# Patient Record
Sex: MALE | Race: WHITE | NOT HISPANIC OR LATINO | Employment: OTHER | URBAN - METROPOLITAN AREA
[De-identification: names, ages, dates, MRNs, and addresses within clinical notes are randomized per-mention and may not be internally consistent; named-entity substitution may affect disease eponyms.]

---

## 2017-03-28 PROBLEM — F41.8 ANXIETY ASSOCIATED WITH DEPRESSION: Status: ACTIVE | Noted: 2017-03-28

## 2017-07-24 PROBLEM — E11.00 TYPE 2 DIABETES MELLITUS WITH HYPEROSMOLARITY WITHOUT COMA, WITHOUT LONG-TERM CURRENT USE OF INSULIN (HCC): Status: ACTIVE | Noted: 2017-07-24

## 2017-08-25 PROBLEM — Z79.4 TYPE 2 DIABETES MELLITUS WITH HYPEROSMOLARITY WITHOUT COMA, WITH LONG-TERM CURRENT USE OF INSULIN (HCC): Status: ACTIVE | Noted: 2017-07-24

## 2018-02-26 PROBLEM — Z91.148 CONTROLLED SUBSTANCE AGREEMENT TERMINATED: Status: ACTIVE | Noted: 2018-02-26

## 2019-10-25 ENCOUNTER — TELEPHONE (OUTPATIENT)
Dept: SCHEDULING | Facility: IMAGING CENTER | Age: 54
End: 2019-10-25

## 2019-11-05 ENCOUNTER — OFFICE VISIT (OUTPATIENT)
Dept: MEDICAL GROUP | Facility: PHYSICIAN GROUP | Age: 54
End: 2019-11-05
Payer: MEDICARE

## 2019-11-05 VITALS
HEIGHT: 76 IN | DIASTOLIC BLOOD PRESSURE: 96 MMHG | WEIGHT: 315 LBS | BODY MASS INDEX: 38.36 KG/M2 | HEART RATE: 112 BPM | OXYGEN SATURATION: 92 % | SYSTOLIC BLOOD PRESSURE: 130 MMHG | TEMPERATURE: 83.3 F

## 2019-11-05 DIAGNOSIS — Z76.89 ESTABLISHING CARE WITH NEW DOCTOR, ENCOUNTER FOR: ICD-10-CM

## 2019-11-05 DIAGNOSIS — E03.9 HYPOTHYROIDISM, UNSPECIFIED TYPE: ICD-10-CM

## 2019-11-05 DIAGNOSIS — F41.8 ANXIETY ASSOCIATED WITH DEPRESSION: ICD-10-CM

## 2019-11-05 DIAGNOSIS — Z23 NEED FOR VACCINATION: ICD-10-CM

## 2019-11-05 DIAGNOSIS — E11.00 TYPE 2 DIABETES MELLITUS WITH HYPEROSMOLARITY WITHOUT COMA, WITHOUT LONG-TERM CURRENT USE OF INSULIN (HCC): ICD-10-CM

## 2019-11-05 DIAGNOSIS — E11.00 TYPE 2 DIABETES MELLITUS WITH HYPEROSMOLARITY WITHOUT COMA, WITH LONG-TERM CURRENT USE OF INSULIN (HCC): ICD-10-CM

## 2019-11-05 DIAGNOSIS — Z79.4 TYPE 2 DIABETES MELLITUS WITH HYPEROSMOLARITY WITHOUT COMA, WITH LONG-TERM CURRENT USE OF INSULIN (HCC): ICD-10-CM

## 2019-11-05 PROCEDURE — 90686 IIV4 VACC NO PRSV 0.5 ML IM: CPT | Performed by: NURSE PRACTITIONER

## 2019-11-05 PROCEDURE — 99204 OFFICE O/P NEW MOD 45 MIN: CPT | Mod: 25 | Performed by: NURSE PRACTITIONER

## 2019-11-05 PROCEDURE — G0008 ADMIN INFLUENZA VIRUS VAC: HCPCS | Performed by: NURSE PRACTITIONER

## 2019-11-05 RX ORDER — INSULIN DEGLUDEC 200 U/ML
INJECTION, SOLUTION SUBCUTANEOUS
COMMUNITY
Start: 2019-10-07 | End: 2019-11-19 | Stop reason: SDUPTHER

## 2019-11-05 RX ORDER — DULAGLUTIDE 0.75 MG/.5ML
INJECTION, SOLUTION SUBCUTANEOUS
COMMUNITY
Start: 2019-10-09 | End: 2019-11-19 | Stop reason: SDUPTHER

## 2019-11-05 ASSESSMENT — PATIENT HEALTH QUESTIONNAIRE - PHQ9
CLINICAL INTERPRETATION OF PHQ2 SCORE: 3
SUM OF ALL RESPONSES TO PHQ QUESTIONS 1-9: 12
5. POOR APPETITE OR OVEREATING: 2 - MORE THAN HALF THE DAYS

## 2019-11-05 NOTE — PROGRESS NOTES
Chief Complaint   Patient presents with   • Establish Care     Pt sts he is here to establish care and to get some medications filled.        HISTORY OF PRESENT ILLNESS: Patient is a 54 y.o. male, new  patient who presents today to discuss medical problems as listed below:    Establishing care with new doctor, encounter for  New patient today.  His anxiety is a meeting concern today.  He does not need any refills today.    Anxiety associated with depression  New problem today.  Patient states chronic anxiety and depression for long time.  Patient is a former [employer] in the [employer].  Is a current everyday smoker, 1 pack for 40 years.  He does have [facility] resources for depression that he utilized in the past.  He was on Lexapro a few years ago which was semi-helpful, not on any antidepressants at this time.  He is single, has a dog.  He has a daughter in the area was 25 and at his support system, he does not have many friends.  Patient is interested in therapy and referral today.  He denies suicidal or homicidal ideations, no attacks in the past.      Patient Active Problem List    Diagnosis Date Noted   • Need for vaccination 11/05/2019   • Establishing care with new doctor, encounter for 11/05/2019   • Controlled substance agreement terminated- no further controlled Rx from [facility]. 02/26/2018   • Type 2 diabetes mellitus with hyperosmolarity without coma, with long-term current use of insulin (HCC) 07/24/2017   • Anxiety associated with depression 03/28/2017   • Insomnia 08/31/2016   • Stasis dermatitis of both legs 06/10/2015   • Edema 06/10/2015   • Neuropathy of foot 06/10/2015   • H/O chronic active hepatitis 02/13/2015   • Chronic pain due to injury 07/09/2014   • Lumbago 07/01/2014   • Hypothyroidism 07/01/2014        Allergies: Vicodin [hydrocodone-acetaminophen]    Current Outpatient Medications   Medication Sig Dispense Refill   • TRULICITY 0.75 MG/0.5ML Solution Pen-injector      • TRESIBA FLEXTOUCH 200 UNIT/ML  Solution Pen-injector      • levothyroxine (SYNTHROID) 100 MCG Tab Take 1 Tab by mouth Every morning on an empty stomach. 90 Tab 3   • insulin glargine (LANTUS SOLOSTAR) 100 UNIT/ML Solution Pen-injector injection Inject 70 units nightly for three days and increase by 3 units every 3 days until morning fasting blood sugar is 150 or less. 15 PEN 1   • metformin (GLUCOPHAGE) 1000 MG tablet Take 1 Tab by mouth 2 times a day, with meals. 180 Tab 0   • ONE TOUCH ULTRA TEST strip 1 Strip by Other route 3 times a day. ICD 10 E11.00 100 Strip 3   • ammonium lactate (LAC-HYDRIN) 12 % Lotion RUB IN THOROUGHLY TO AFFECTED AREA TWICE DAILY 400 g 3   • glucose blood strip 1 Strip by Other route 3 times a day. 50 Strip 3   • ONETOUCH DELICA LANCETS 33G Misc 1 Device by Does not apply route 3 times a day. 100 Each 3   • furosemide (LASIX) 40 MG Tab Take 1 Tab by mouth every day. 90 Tab 3   • potassium chloride (KLOR-CON) 20 MEQ Pack Take 1 Packet by mouth 2 times a day. 90 Packet 3   • Blood Glucose Monitoring Suppl Supplies Misc Test twice daily, fasting and 2 hours post prandial 100 Strip 3   • Lancets Misc Lantus solostar needles  Sig: use prn ssx high or low sugar. DX: E11.00 100 Each 2   • escitalopram (LEXAPRO) 10 MG Tab Take 1 Tab by mouth every day. (Patient not taking: Reported on 11/5/2019) 30 Tab 1     No current facility-administered medications for this visit.        Social History     Tobacco Use   • Smoking status: Current Every Day Smoker     Packs/day: 1.00     Years: 40.00     Pack years: 40.00     Types: Cigarettes   • Smokeless tobacco: Never Used   Substance Use Topics   • Alcohol use: No   • Drug use: No     Social History     Patient does not qualify to have social determinant information on file (likely too young).   Social History Narrative   • Not on file       Family History   Problem Relation Age of Onset   • Heart Disease Father    • Alcohol abuse Father        Allergies, past medical history, past  "surgical history, family history, social history reviewed and updated.    Review of Systems:      - Constitutional: Negative for fever, chills, unexpected weight change, and fatigue/generalized weakness.     - HEENT: Negative for headaches, vision changes, hearing changes, ear pain, ear discharge, rhinorrhea, sinus congestion, sore throat, and neck pain.      - Respiratory: Negative for cough, sputum production, chest congestion, dyspnea, wheezing, and crackles.      - Cardiovascular: Negative for chest pain, palpitations, orthopnea, and bilateral lower extremity edema.     - Gastrointestinal: Negative for heartburn, nausea, vomiting, abdominal pain, hematochezia, melena, diarrhea, constipation, and greasy/foul-smelling stools.     - Genitourinary: Negative for dysuria, polyuria, hematuria, pyuria, urinary urgency, and urinary incontinence.    - Musculoskeletal: Negative for myalgias, back pain, and joint pain.     - Skin: Negative for rash, itching, cyanotic skin color change.     - Neurological: Negative for dizziness, tingling, tremors, focal sensory deficit, focal weakness and headaches.     - Endo/Heme/Allergies: Does not bruise/bleed easily.     - Psychiatric/Behavioral: Anxiety and depression.  Negative for suicidal/homicidal ideation and memory loss.      All other systems reviewed and are negative    Exam:    /96 (BP Location: Left arm, Patient Position: Sitting, BP Cuff Size: Large adult)   Pulse (!) 112   Temp (!) 28.5 °C (83.3 °F) (Temporal)   Ht 1.93 m (6' 4\")   Wt (!) 151 kg (333 lb)   SpO2 92%   BMI 40.53 kg/m²  Body mass index is 40.53 kg/m².    Physical Exam:  Constitutional: Well-developed and well-nourished. Not diaphoretic. No distress.   Skin: Skin is warm and dry. No rash noted.  Head: Atraumatic without lesions.  Eyes: Conjunctivae and extraocular motions are normal. Pupils are equal, round, and reactive to light. No scleral icterus.   Ears:  External ears unremarkable. Tympanic " membranes clear and intact.  Nose: Nares patent. Septum midline. Turbinates without erythema nor edema. No discharge.   Mouth/Throat: Dentition is normal. Tongue normal. Oropharynx is clear and moist. Posterior pharynx without erythema or exudates.  Neck: Supple, trachea midline. Normal range of motion. No thyromegaly present. No lymphadenopathy--cervical or supraclavicular.  Cardiovascular: Regular rate and rhythm, S1 and S2 without murmur, rubs, or gallops.    Chest: Effort normal. Clear to auscultation throughout. No adventitious sounds. No CVA tenderness.  Abdomen: Soft, non tender, and without distention. Active bowel sounds in all four quadrants. No rebound, guarding, masses or HSM.  : Negative for dysuria, polyuria, hematuria, pyuria, urinary urgency, and urinary incontinence.  Extremities: No cyanosis, clubbing, erythema, nor edema. Distal pulses intact and symmetric.   Musculoskeletal: All major joints AROM full in all directions without pain.  Neurological: Alert and oriented x 3. DTRs 2+/3 and symmetric. No cranial nerve deficit. 5/5 myotomes. Sensation intact. Negative Rhomberg.  Psychiatric:  Behavior, mood, and affect are appropriate.    Assessment/Plan:  1. Need for vaccination  - Influenza Vaccine Quad Injection (PF)    2. Type 2 diabetes mellitus with hyperosmolarity without coma, without long-term current use of insulin (HCC)    3. Type 2 diabetes mellitus with hyperosmolarity without coma, with long-term current use of insulin (HCC)  - Comp Metabolic Panel; Future  - HEMOGLOBIN A1C; Future  - Lipid Profile; Future    4. Hypothyroidism, unspecified type  - Comp Metabolic Panel; Future  - FREE THYROXINE; Future  - Lipid Profile; Future  - TSH; Future    5. Anxiety associated with depression  Uncontrolled, stable.  Patient declines need for intervention today.  Referral to behavioral health placed.  Resources and contact information for [facility] given.   We will follow-up next visit.  - CBC WITH DIFFERENTIAL; Future  - Comp Metabolic Panel; Future  - VITAMIN D,25 HYDROXY; Future  - REFERRAL TO BEHAVIORAL HEALTH    6. BMI 29.0-29.9,adult  - CBC WITH DIFFERENTIAL; Future  - Comp Metabolic Panel; Future  - FREE THYROXINE; Future  - HEMOGLOBIN A1C; Future  - Lipid Profile; Future  - TSH; Future    7. Establishing care with new doctor, encounter for      Discussed with patient possible alternative diagnoses, pt is to take all medications as prescribed. If symptoms persist FU w/PCP, if symptoms worsen go to emergency room. If experiencing any side effects from prescribed medications reports to the office immediately or go to emergency room.  Reviewed indication, dosage, usage and potential adverse effects of prescribed medications. Reviewed risks and benefits of treatment plan. Patient verbalizes understanding of all instruction and verbally agrees to plan.    Return in about 2 weeks (around 11/19/2019).

## 2019-11-05 NOTE — ASSESSMENT & PLAN NOTE
New problem today.  Patient states chronic anxiety and depression for long time.  Patient is a former [employer] in [employer].  Is a current everyday smoker, 1 pack for 40 years.  He does have [facility] resources for depression that he utilized in the past.  He was on Lexapro a few years ago which was semi-helpful, not on any antidepressants at this time.  He is single, has a dog.  He has a daughter in the area was 25 and at his support system, he does not have many friends.  Patient is interested in therapy and referral today.  He denies suicidal or homicidal ideations, no attacks in the past.

## 2019-11-11 ENCOUNTER — TELEPHONE (OUTPATIENT)
Dept: MEDICAL GROUP | Facility: PHYSICIAN GROUP | Age: 54
End: 2019-11-11

## 2019-11-12 ENCOUNTER — TELEPHONE (OUTPATIENT)
Dept: MEDICAL GROUP | Facility: PHYSICIAN GROUP | Age: 54
End: 2019-11-12

## 2019-11-12 RX ORDER — INSULIN DEGLUDEC 200 U/ML
INJECTION, SOLUTION SUBCUTANEOUS
Status: CANCELLED | OUTPATIENT
Start: 2019-11-12

## 2019-11-12 NOTE — TELEPHONE ENCOUNTER
1. Caller Name: Douglas Landa Jr.                                           Call Back Number: [phone]        Patient approves a detailed voicemail message: N\A    Spoke with pt, Pt stated that he has enough insulin till [id number]. Told pt that if something occurs, to give us a call and we can send it in sooner. Pt understood.    PJ Sempel  Med Ass't

## 2019-11-19 ENCOUNTER — OFFICE VISIT (OUTPATIENT)
Dept: MEDICAL GROUP | Facility: PHYSICIAN GROUP | Age: 54
End: 2019-11-19
Payer: MEDICARE

## 2019-11-19 VITALS
HEIGHT: 76 IN | SYSTOLIC BLOOD PRESSURE: 132 MMHG | TEMPERATURE: 96.1 F | WEIGHT: 315 LBS | RESPIRATION RATE: 18 BRPM | DIASTOLIC BLOOD PRESSURE: 70 MMHG | HEART RATE: 100 BPM | OXYGEN SATURATION: 94 % | BODY MASS INDEX: 38.36 KG/M2

## 2019-11-19 DIAGNOSIS — Z79.4 TYPE 2 DIABETES MELLITUS WITH HYPEROSMOLARITY WITHOUT COMA, WITH LONG-TERM CURRENT USE OF INSULIN (HCC): ICD-10-CM

## 2019-11-19 DIAGNOSIS — E11.00 TYPE 2 DIABETES MELLITUS WITH HYPEROSMOLARITY WITHOUT COMA, WITH LONG-TERM CURRENT USE OF INSULIN (HCC): ICD-10-CM

## 2019-11-19 DIAGNOSIS — E03.9 HYPOTHYROIDISM, UNSPECIFIED TYPE: ICD-10-CM

## 2019-11-19 DIAGNOSIS — E55.9 VITAMIN D DEFICIENCY: ICD-10-CM

## 2019-11-19 PROCEDURE — 99214 OFFICE O/P EST MOD 30 MIN: CPT | Performed by: NURSE PRACTITIONER

## 2019-11-19 RX ORDER — INSULIN DEGLUDEC 200 U/ML
72 INJECTION, SOLUTION SUBCUTANEOUS DAILY
Qty: 400 ML | Refills: 2 | Status: SHIPPED
Start: 2019-11-19 | End: 2020-06-04

## 2019-11-19 RX ORDER — LANCETS 30 GAUGE
EACH MISCELLANEOUS
Qty: 100 EACH | Refills: 0 | Status: SHIPPED
Start: 2019-11-19 | End: 2020-06-04

## 2019-11-19 RX ORDER — LEVOTHYROXINE SODIUM 0.07 MG/1
75 TABLET ORAL
Qty: 30 TAB | Refills: 2 | Status: SHIPPED
Start: 2019-11-19 | End: 2019-12-17

## 2019-11-19 RX ORDER — LEVOTHYROXINE SODIUM 0.1 MG/1
100 TABLET ORAL
Qty: 90 TAB | Refills: 3 | Status: SHIPPED | OUTPATIENT
Start: 2019-11-19 | End: 2019-11-19

## 2019-11-19 RX ORDER — ERGOCALCIFEROL 1.25 MG/1
50000 CAPSULE ORAL
Qty: 12 CAP | Refills: 2 | Status: SHIPPED
Start: 2019-11-19 | End: 2020-06-04

## 2019-11-19 RX ORDER — DULAGLUTIDE 0.75 MG/.5ML
0.75 INJECTION, SOLUTION SUBCUTANEOUS
Qty: 4 PEN | Refills: 2 | Status: SHIPPED | OUTPATIENT
Start: 2019-11-19 | End: 2020-04-07 | Stop reason: SDUPTHER

## 2019-11-20 RX ORDER — BLOOD SUGAR DIAGNOSTIC
STRIP MISCELLANEOUS
COMMUNITY
End: 2020-06-04

## 2019-11-20 RX ORDER — LEVOTHYROXINE SODIUM 0.1 MG/1
100 TABLET ORAL
COMMUNITY
End: 2019-12-30

## 2019-11-20 RX ORDER — HYDROCHLOROTHIAZIDE 25 MG/1
25 TABLET ORAL DAILY
COMMUNITY
End: 2019-12-17

## 2019-11-20 NOTE — ASSESSMENT & PLAN NOTE
Reviewed recent labs, A1c of 6.4.  Patient is taking Glucophage 1000 mg twice daily, Trulicity 0.75mg/0.5 ml SQ every week, Tresiba 72 units daily.  Patient reports checking BG daily.  Denies chest pain, dizziness, incidences of hypoglycemia, no polyuria, polyphagia.  Patient reports starting healthy diet, avoiding simple carbohydrates and sugary drinks.

## 2019-11-20 NOTE — ASSESSMENT & PLAN NOTE
Reviewed recent lab values, noted TSH at 0.0 14 which is decreased.  Patient is currently taking levothyroxine 75 mcg daily.  He reports anxiety, insomnia.

## 2019-11-20 NOTE — PROGRESS NOTES
Chief Complaint   Patient presents with   • Lab Results       HISTORY OF PRESENT ILLNESS: Patient is a 54 y.o. male, established patient who presents today to discuss medical problems as listed below:    Health Maintenance:  COMPLETED    Type 2 diabetes mellitus with hyperosmolarity without coma, with long-term current use of insulin (CMS-[facility])  Reviewed recent labs, A1c of 6.4.  Patient is taking Glucophage 1000 mg twice daily, Trulicity 0.75mg/0.5 ml SQ every week, Tresiba 72 units daily.  Patient reports checking BG daily.  Denies chest pain, dizziness, incidences of hypoglycemia, no polyuria, polyphagia.  Patient reports starting healthy diet, avoiding simple carbohydrates and sugary drinks.    Hypothyroidism  Reviewed recent lab values, noted TSH at 0.0 14 which is decreased.  Patient is currently taking levothyroxine 75 mcg daily.  He reports anxiety, insomnia.      Patient Active Problem List    Diagnosis Date Noted   • Vitamin D deficiency 11/19/2019   • Need for vaccination 11/05/2019   • Establishing care with new doctor, encounter for 11/05/2019   • Controlled substance agreement terminated- no further controlled Rx from [facility]. 02/26/2018   • Type 2 diabetes mellitus with hyperosmolarity without coma, with long-term current use of insulin ([facility]) 07/24/2017   • Anxiety associated with depression 03/28/2017   • Insomnia 08/31/2016   • Stasis dermatitis of both legs 06/10/2015   • Edema 06/10/2015   • Neuropathy of foot 06/10/2015   • H/O chronic active hepatitis 02/13/2015   • Chronic pain due to injury 07/09/2014   • Lumbago 07/01/2014   • Hypothyroidism 07/01/2014        Allergies: Vicodin [hydrocodone-acetaminophen]    Current Outpatient Medications   Medication Sig Dispense Refill   • vitamin D, Ergocalciferol, (DRISDOL) 99655 units Cap capsule Take 1 Cap by mouth every 7 days. 12 Cap 2   • Blood Glucose Meter Kit Test blood sugar as recommended by provider. Abbott Freestyle Lite blood glucose monitoring  kit. 1 Kit 0   • Blood Glucose Test Strips Use one Abbott Freestyle Lite strip to test blood sugar three times daily. 100 Strip 0   • Lancets Use one Abbott Freestyle Lite lancet to test blood sugar three times daily. 100 Each 0   • TRESIBA FLEXTOUCH 200 UNIT/ML Solution Pen-injector Inject 72 Units as instructed every day. 400 mL 2   • levothyroxine (SYNTHROID) 100 MCG Tab Take 1 Tab by mouth Every morning on an empty stomach. 90 Tab 3   • levothyroxine (SYNTHROID) 75 MCG Tab Take 1 Tab by mouth Every morning on an empty stomach. 30 Tab 2   • TRULICITY 0.75 MG/0.5ML Solution Pen-injector Inject 0.75 mg as instructed every 7 days. 4 PEN 2   • metformin (GLUCOPHAGE) 1000 MG tablet Take 1 Tab by mouth 2 times a day, with meals. 180 Tab 0   • ONE TOUCH ULTRA TEST strip 1 Strip by Other route 3 times a day. ICD 10 E11.00 100 Strip 3   • ammonium lactate (LAC-HYDRIN) 12 % Lotion RUB IN THOROUGHLY TO AFFECTED AREA TWICE DAILY 400 g 3   • furosemide (LASIX) 40 MG Tab Take 1 Tab by mouth every day. 90 Tab 3   • potassium chloride (KLOR-CON) 20 MEQ Pack Take 1 Packet by mouth 2 times a day. 90 Packet 3   • Blood Glucose Monitoring Suppl Supplies Misc Test twice daily, fasting and 2 hours post prandial 100 Strip 3   • escitalopram (LEXAPRO) 10 MG Tab Take 1 Tab by mouth every day. (Patient not taking: Reported on 11/5/2019) 30 Tab 1   • glucose blood strip 1 Strip by Other route 3 times a day. 50 Strip 3   • ONETOUCH DELICA LANCETS 33G Misc 1 Device by Does not apply route 3 times a day. 100 Each 3   • Lancets Misc Lantus solostar needles  Sig: use prn ssx high or low sugar. DX: E11.00 100 Each 2     No current facility-administered medications for this visit.        Social History     Tobacco Use   • Smoking status: Current Every Day Smoker     Packs/day: 1.00     Years: 40.00     Pack years: 40.00     Types: Cigarettes   • Smokeless tobacco: Never Used   Substance Use Topics   • Alcohol use: No   • Drug use: No     Social  "History     Patient does not qualify to have social determinant information on file (likely too young).   Social History Narrative   • Not on file       Family History   Problem Relation Age of Onset   • Heart Disease Father    • Alcohol abuse Father        Allergies, past medical history, past surgical history, family history, social history reviewed and updated.    Review of Systems:      - Constitutional: Negative for fever, chills, unexpected weight change, and fatigue/generalized weakness.     - Respiratory: Negative for cough, sputum production, chest congestion, dyspnea, wheezing, and crackles.      - Cardiovascular: Negative for chest pain, palpitations, orthopnea, and bilateral lower extremity edema.     - Psychiatric/Behavioral: Negative for depression, suicidal/homicidal ideation and memory loss.      All other systems reviewed and are negative    Exam:    /70   Pulse 100   Temp (!) 35.6 °C (96.1 °F) (Temporal)   Resp 18   Ht 1.93 m (6' 4\")   Wt (!) 144.9 kg (319 lb 6.4 oz)   SpO2 94%   BMI 38.88 kg/m²  Body mass index is 38.88 kg/m².    Physical Exam:  Constitutional: Well-developed and well-nourished. Not diaphoretic. No distress.   Cardiovascular: Regular rate and rhythm, S1 and S2 without murmur, rubs, or gallops.    Chest: Effort normal. Clear to auscultation throughout. No adventitious sounds. Psychiatric:  Behavior, mood, and affect are appropriate.    LABS: 11/12  results reviewed and discussed with the patient, questions answered.    Patient was seen for 25 minutes face to face of which > 50% of appointment time was spent on counseling and coordination of care regarding the above.  Time was spent in discussing exercise and healthy lifestyle, detailed recommendations below under assessment and plan.    Assessment/Plan:  1. Vitamin D deficiency  Uncontrolled.  After Rx completion, OTC vitamin D 3 5000 IUs daily.  - vitamin D, Ergocalciferol, (DRISDOL) 08153 units Cap capsule; Take 1 " Cap by mouth every 7 days.  Dispense: 12 Cap; Refill: 2  - VITAMIN D,25 HYDROXY; Future    2. Type 2 diabetes mellitus with hyperosmolarity without coma, with long-term current use of insulin (HCC)  Stable.  Continue current medications.  Thorough discussion of healthy lifestyle including exercise and healthy nutrition.  Would appreciate an additional guidance and support and management from pharmacology standpoint.  - Blood Glucose Meter Kit; Test blood sugar as recommended by provider. Abbott Freestyle Lite blood glucose monitoring kit.  Dispense: 1 Kit; Refill: 0  - Blood Glucose Test Strips; Use one Abbott Freestyle Lite strip to test blood sugar three times daily.  Dispense: 100 Strip; Refill: 0  - Lancets; Use one Abbott Freestyle Lite lancet to test blood sugar three times daily.  Dispense: 100 Each; Refill: 0  - REFERRAL TO PHARMACOTHERAPY SERVICE  - TRESIBA FLEXTOUCH 200 UNIT/ML Solution Pen-injector; Inject 72 Units as instructed every day.  Dispense: 400 mL; Refill: 2  - HEMOGLOBIN A1C; Future  - REFERRAL TO CARDIOLOGY  - TRULICITY 0.75 MG/0.5ML Solution Pen-injector; Inject 0.75 mg as instructed every 7 days.  Dispense: 4 PEN; Refill: 2    3. Hypothyroidism, unspecified type  Uncontrolled.  Will decrease levothyroxine from 100 mcg to 75 mcg and recheck TSH in 4 weeks.  - levothyroxine (SYNTHROID) 75 MCG Tab; Take 1 Tab by mouth Every morning on an empty stomach.  Dispense: 30 Tab; Refill: 2  - TSH; Future  - Lipid Profile; Future    Discussed with patient possible alternative diagnoses, pt is to take all medications as prescribed. If symptoms persist FU w/PCP, if symptoms worsen go to emergency room. If experiencing any side effects from prescribed medications reports to the office immediately or go to emergency room.  Reviewed indication, dosage, usage and potential adverse effects of prescribed medications. Reviewed risks and benefits of treatment plan. Patient verbalizes understanding of all  instruction and verbally agrees to plan.    Return if symptoms worsen or fail to improve.

## 2019-11-22 ENCOUNTER — TELEPHONE (OUTPATIENT)
Dept: VASCULAR LAB | Facility: MEDICAL CENTER | Age: 54
End: 2019-11-22

## 2019-11-22 RX ORDER — AMMONIUM LACTATE 12 G/100G
LOTION TOPICAL
Qty: 400 G | Refills: 0 | Status: SHIPPED | OUTPATIENT
Start: 2019-11-22 | End: 2020-03-20 | Stop reason: SDUPTHER

## 2019-11-22 NOTE — TELEPHONE ENCOUNTER
Called and left msg for pt to call back to establish care regarding pharmacotherapy referral for DM2 from Dr. Pelletier on 11/19/19.    Insurance: Medicare  PCP: STANFORD Fang  Locations to be seen: [facility] at [phone], fax [phone]    Rosaura Collier, RhysD

## 2019-12-05 ENCOUNTER — OFFICE VISIT (OUTPATIENT)
Dept: MEDICAL GROUP | Facility: PHYSICIAN GROUP | Age: 54
End: 2019-12-05
Payer: MEDICARE

## 2019-12-05 VITALS — WEIGHT: 315 LBS | HEIGHT: 76 IN | BODY MASS INDEX: 38.36 KG/M2

## 2019-12-05 DIAGNOSIS — Z79.4 TYPE 2 DIABETES MELLITUS WITH HYPEROSMOLARITY WITHOUT COMA, WITH LONG-TERM CURRENT USE OF INSULIN (HCC): ICD-10-CM

## 2019-12-05 DIAGNOSIS — F41.8 ANXIETY ASSOCIATED WITH DEPRESSION: ICD-10-CM

## 2019-12-05 DIAGNOSIS — Z71.3 KETOGENIC DIET MONITORING ENCOUNTER: ICD-10-CM

## 2019-12-05 DIAGNOSIS — E11.00 TYPE 2 DIABETES MELLITUS WITH HYPEROSMOLARITY WITHOUT COMA, WITH LONG-TERM CURRENT USE OF INSULIN (HCC): ICD-10-CM

## 2019-12-05 DIAGNOSIS — E78.2 MIXED HYPERLIPIDEMIA: ICD-10-CM

## 2019-12-05 PROCEDURE — 99214 OFFICE O/P EST MOD 30 MIN: CPT | Performed by: NURSE PRACTITIONER

## 2019-12-05 NOTE — ASSESSMENT & PLAN NOTE
Current BMI 39.07.  Patient is trying to lose weight, states he has been exercising and trying to eat healthier, reports being on keto diet.  He reports no dietary restriction during Thanksgiving.  Patient also reports high uncontrolled anxiety.  He is interested in further guidance and weight management.  He denies pain, dyspnea at rest or with exertion, headaches, dizziness, NVD.

## 2019-12-05 NOTE — PROGRESS NOTES
Chief Complaint   Patient presents with   • Follow-Up       HISTORY OF PRESENT ILLNESS: Patient is a 54 y.o. male, established patient who presents today to discuss medical problems as listed below:    Health Maintenance:  COMPLETED.    Anxiety associated with depression  Known problem.  Patient reports chronic anxiety and depression.  He also admits to labile temperament and emotions.  No changes since last visit.  Not taking any medication.  Today he reports he was diagnosed with ADHD in childhood and was taking Adderall.  Today he denies chest pain, dyspnea, nausea vomiting, headaches or dizziness.  He denies suicidal homicidal ideation.    BMI 39.0-39.9,adult  Current BMI 39.07.  Patient is trying to lose weight, states he has been exercising and trying to eat healthier, reports being on keto diet.  He reports no dietary restriction during Thanksgiving.  Patient also reports high uncontrolled anxiety.  He is interested in further guidance and weight management.  He denies pain, dyspnea at rest or with exertion, headaches, dizziness, NVD.      Patient Active Problem List    Diagnosis Date Noted   • BMI 39.0-39.9,adult 12/05/2019   • Mixed hyperlipidemia 12/05/2019   • Vitamin D deficiency 11/19/2019   • Need for vaccination 11/05/2019   • Establishing care with new doctor, encounter for 11/05/2019   • Controlled substance agreement terminated- no further controlled Rx from [facility]. 02/26/2018   • Type 2 diabetes mellitus with hyperosmolarity without coma, with long-term current use of insulin (HCC) 07/24/2017   • Anxiety associated with depression 03/28/2017   • Insomnia 08/31/2016   • Stasis dermatitis of both legs 06/10/2015   • Edema 06/10/2015   • Neuropathy of foot 06/10/2015   • H/O chronic active hepatitis 02/13/2015   • Chronic pain due to injury 07/09/2014   • Lumbago 07/01/2014   • Hypothyroidism 07/01/2014        Allergies: Vicodin [hydrocodone-acetaminophen]    Current Outpatient Medications   Medication  Sig Dispense Refill   • insulin lispro, Human, (HUMALOG KWIKPEN) 100 UNIT/ML injection PEN Inject  as instructed 3 times a day before meals. Inject 0-22u by subcutaneous route before each meal (TID) Max 88u day     • hydroCHLOROthiazide (HYDRODIURIL) 25 MG Tab Take 25 mg by mouth every day.     • levothyroxine (SYNTHROID) 100 MCG Tab Take 100 mcg by mouth Every morning on an empty stomach.     • Insulin Pen Needle (PEN NEEDLES) 32G X 5 MM Misc by Does not apply route.     • vitamin D, Ergocalciferol, (DRISDOL) 34024 units Cap capsule Take 1 Cap by mouth every 7 days. 12 Cap 2   • Blood Glucose Meter Kit Test blood sugar as recommended by provider. Abbott Freestyle Lite blood glucose monitoring kit. 1 Kit 0   • Blood Glucose Test Strips Use one Abbott Freestyle Lite strip to test blood sugar three times daily. 100 Strip 0   • Lancets Use one Abbott Freestyle Lite lancet to test blood sugar three times daily. 100 Each 0   • TRESIBA FLEXTOUCH 200 UNIT/ML Solution Pen-injector Inject 72 Units as instructed every day. 400 mL 2   • TRULICITY 0.75 MG/0.5ML Solution Pen-injector Inject 0.75 mg as instructed every 7 days. 4 PEN 2   • metformin (GLUCOPHAGE) 1000 MG tablet Take 1 Tab by mouth 2 times a day, with meals. 180 Tab 0   • ONE TOUCH ULTRA TEST strip 1 Strip by Other route 3 times a day. ICD 10 E11.00 (Patient taking differently: 1 Each by Other route 3 times a day. ICD 10 E11.00  Indications: one touch delica 33 gauge) 100 Strip 3   • furosemide (LASIX) 40 MG Tab Take 1 Tab by mouth every day. 90 Tab 3   • potassium chloride (KLOR-CON) 20 MEQ Pack Take 1 Packet by mouth 2 times a day. 90 Packet 3   • Blood Glucose Monitoring Suppl Supplies Misc Test twice daily, fasting and 2 hours post prandial 100 Strip 3   • ammonium lactate (LAC-HYDRIN) 12 % Lotion RUB IN THOROUGHLY TO AFFECTED AREA TWICE DAILY 400 g 0   • levothyroxine (SYNTHROID) 75 MCG Tab Take 1 Tab by mouth Every morning on an empty stomach. (Patient not  "taking: Reported on 12/5/2019) 30 Tab 2   • escitalopram (LEXAPRO) 10 MG Tab Take 1 Tab by mouth every day. (Patient not taking: Reported on 11/5/2019) 30 Tab 1     No current facility-administered medications for this visit.        Social History     Tobacco Use   • Smoking status: Current Every Day Smoker     Packs/day: 1.00     Years: 40.00     Pack years: 40.00     Types: Cigarettes   • Smokeless tobacco: Never Used   Substance Use Topics   • Alcohol use: No   • Drug use: No     Social History     Patient does not qualify to have social determinant information on file (likely too young).   Social History Narrative   • Not on file       Family History   Problem Relation Age of Onset   • Heart Disease Father    • Alcohol abuse Father        Allergies, past medical history, past surgical history, family history, social history reviewed and updated.    Review of Systems:      - Constitutional: Difficulty losing weight.  Negative for fever, chills, and fatigue/generalized weakness.     - Respiratory: Negative for cough, sputum production, chest congestion, dyspnea, wheezing, and crackles.      - Cardiovascular: Negative for chest pain, palpitations, orthopnea, and bilateral lower extremity edema.     - Psychiatric/Behavioral: Positive for anxiety and depression.  Negative for suicidal/homicidal ideation and memory loss.      All other systems reviewed and are negative    Exam:    Ht 1.93 m (6' 4\")   Wt (!) 145.6 kg (321 lb)   BMI 39.07 kg/m²  Body mass index is 39.07 kg/m².    Physical Exam:  Constitutional: Well-developed and well-nourished. Not diaphoretic. No distress.   Cardiovascular: Regular rate and rhythm, S1 and S2 without murmur, rubs, or gallops.    Chest: Effort normal. Clear to auscultation throughout. No adventitious sounds.   Psychiatric:  Anxious, however appropriate.    Patient was seen for 25 minutes face to face of which > 50% of appointment time was spent on counseling and coordination of care " regarding the above.  So education about a full effects of uncontrolled anxiety, also educated possible association of uncontrolled anxiety and difficulty losing weight.  Discussed healthy lifestyle such as stress control, exercise and healthy nutrition.    Assessment/Plan:  1. Type 2 diabetes mellitus with hyperosmolarity without coma, with long-term current use of insulin (HCC)  For additional support patient will follow-up with hip problem.   - REFERRAL TO [facility] (HIP) Services Requested: Physician Medical Weight Management Program, Diabetes Prevention Program; Reason for Referral? BMI>30; Reason for Visit: Overweight/Obesity, Medical Condition Requiring Nutri...  - HEMOGLOBIN A1C; Future    2. BMI 39.0-39.9,adult  Uncontrolled.  Thorough discussion about possible etiologies such as uncontrolled anxiety and stress, diabetes, healthy nutrition (thorough discussion about nutritional choices).  - REFERRAL TO [facility] (HIP) Services Requested: Physician Medical Weight Management Program, Diabetes Prevention Program; Reason for Referral? BMI>30; Reason for Visit: Overweight/Obesity, Medical Condition Requiring Nutri...  - Comp Metabolic Panel; Future  - OBESITY COUNSELING (No Charge): Patient identified as having weight management issue.  Appropriate orders and counseling given.    3. Anxiety associated with depression  Uncontrolled.  Discussed possible etiologies, would appreciate psychiatry evaluation for underlying psychiatric diagnoses.  Discussed the importance of healthy lifestyle, such as healthy nutrition, avoiding stimulants, sugary foods, simple carbohydrates.  Also discussed the importance of stress control such as meditation, mindfulness try different form of exercise such as yoga, swimming.  In office demonstration of proper positioning techniques.  Patient recommended to practice guided meditation upon awakening and before bed.  Patient is aware of  resources for immediate interventions such as [facility].  Patient also made aware to call 911 or national suicide line in case of emergencies.  - REFERRAL TO BEHAVIORAL HEALTH  - REFERRAL TO PSYCHIATRY    4. Mixed hyperlipidemia  We will review labs and discuss with patient.  - Comp Metabolic Panel; Future  - Lipid Profile; Future  - HEMOGLOBIN A1C; Future    Discussed with patient possible alternative diagnoses, pt is to take all medications as prescribed. If symptoms persist FU w/PCP, if symptoms worsen go to emergency room. If experiencing any side effects from prescribed medications reports to the office immediately or go to emergency room.  Reviewed indication, dosage, usage and potential adverse effects of prescribed medications. Reviewed risks and benefits of treatment plan. Patient verbalizes understanding of all instruction and verbally agrees to plan.    Return in about 2 months (around 2/5/2020), or if symptoms worsen or fail to improve.

## 2019-12-05 NOTE — ASSESSMENT & PLAN NOTE
Patient reports starting ketogenic diet, relapsed during Thanksgiving, would like to start back up.  Patient reports hearing about the benefits of ketogenic diet and would like to try it.

## 2019-12-05 NOTE — ASSESSMENT & PLAN NOTE
Known problem.  Patient reports chronic anxiety and depression.  He also admits to labile temperament and emotions.  No changes since last visit.  Not taking any medication.  Today he reports he was diagnosed with ADHD in childhood and was taking Adderall.  Today he denies chest pain, dyspnea, nausea vomiting, headaches or dizziness.  He denies suicidal homicidal ideation.

## 2019-12-12 ENCOUNTER — TELEPHONE (OUTPATIENT)
Dept: CARDIOLOGY | Facility: MEDICAL CENTER | Age: 54
End: 2019-12-12

## 2019-12-12 DIAGNOSIS — E78.2 MIXED HYPERLIPIDEMIA: ICD-10-CM

## 2019-12-12 NOTE — TELEPHONE ENCOUNTER
LVM in regards to any cardiac records pt may have outside of [facility]. Pt has upcoming TeleMed appt with [facility] on 12-17-19.

## 2019-12-17 ENCOUNTER — TELEMEDICINE ORIGINATING SITE VISIT (OUTPATIENT)
Dept: MEDICAL GROUP | Facility: CLINIC | Age: 54
End: 2019-12-17
Payer: MEDICARE

## 2019-12-17 ENCOUNTER — TELEMEDICINE2 (OUTPATIENT)
Dept: CARDIOLOGY | Facility: MEDICAL CENTER | Age: 54
End: 2019-12-17
Payer: MEDICARE

## 2019-12-17 VITALS
TEMPERATURE: 97.4 F | DIASTOLIC BLOOD PRESSURE: 86 MMHG | SYSTOLIC BLOOD PRESSURE: 132 MMHG | HEIGHT: 76 IN | OXYGEN SATURATION: 95 % | BODY MASS INDEX: 38.36 KG/M2 | RESPIRATION RATE: 20 BRPM | HEART RATE: 103 BPM | WEIGHT: 315 LBS

## 2019-12-17 DIAGNOSIS — Z72.0 TOBACCO ABUSE: ICD-10-CM

## 2019-12-17 DIAGNOSIS — I50.30 DIASTOLIC CONGESTIVE HEART FAILURE, UNSPECIFIED HF CHRONICITY (HCC): ICD-10-CM

## 2019-12-17 DIAGNOSIS — I48.20 CHRONIC ATRIAL FIBRILLATION (HCC): ICD-10-CM

## 2019-12-17 DIAGNOSIS — E78.2 MIXED HYPERLIPIDEMIA: ICD-10-CM

## 2019-12-17 PROBLEM — I50.9 CHF (CONGESTIVE HEART FAILURE) (HCC): Status: ACTIVE | Noted: 2019-12-17

## 2019-12-17 PROCEDURE — 99406 BEHAV CHNG SMOKING 3-10 MIN: CPT | Performed by: INTERNAL MEDICINE

## 2019-12-17 PROCEDURE — 99205 OFFICE O/P NEW HI 60 MIN: CPT | Mod: 25 | Performed by: INTERNAL MEDICINE

## 2019-12-17 ASSESSMENT — ENCOUNTER SYMPTOMS
FOCAL WEAKNESS: 0
CLAUDICATION: 0
FEVER: 0
VOMITING: 0
DEPRESSION: 0
ABDOMINAL PAIN: 0
BLURRED VISION: 0
NEUROLOGICAL NEGATIVE: 1
GASTROINTESTINAL NEGATIVE: 1
RESPIRATORY NEGATIVE: 1
PSYCHIATRIC NEGATIVE: 1
SHORTNESS OF BREATH: 0
DIZZINESS: 0
DOUBLE VISION: 0
PALPITATIONS: 0
HEADACHES: 0
MUSCULOSKELETAL NEGATIVE: 1
COUGH: 0
CHILLS: 0
WEAKNESS: 0
CARDIOVASCULAR NEGATIVE: 1
MYALGIAS: 0
EYES NEGATIVE: 1
NERVOUS/ANXIOUS: 0
NAUSEA: 0
BRUISES/BLEEDS EASILY: 0
WEIGHT LOSS: 0

## 2019-12-17 NOTE — LETTER
[facility]   [address], [address]  [place] [address]  Phone: [phone]  Fax: [phone]              Douglas Landa Jr.  1965    Encounter Date: 12/17/2019    Lakhwinder Zavala M.D.          PROGRESS NOTE:  Chief Complaint   Patient presents with   • Hyperlipidemia       Subjective:   Douglas Landa Jr. is a 54 y.o. male who presents today as a Tele-medicine consult from [facility] for cardiac evaluation.    Thank you for allowing me to evaluate Mr. Landa, who as you know is a 54 year old male with hyperlipidemia, chronic tobacco abuse and strong family history of coronary artery disease. He is clinically doing well. He admits to fatigue. He denies chest pain, shortness of breath, palpitations, nausea/vomiting or diaphoresis. He continues to smoke.    Past Medical History:   Diagnosis Date   • Anxiety    • Congestive heart failure (HCC)    • Depression    • Diabetes    • Hep C w/o coma, chronic (HCC)    • Hyperlipidemia    • Insomnia    • Neuropathy of foot 6/10/2015   • Thyroid disorder      Past Surgical History:   Procedure Laterality Date   • HERNIA REPAIR  2015    umbilical repair   • ENDOSCOPY PROCEDURE  6/10/2014    Performed by Frantz Hidalgo M.D. at SURGERY [facility]   • ABDOMINAL EXPLORATION     • OPEN REDUCTION       Family History   Problem Relation Age of Onset   • Heart Disease Father    • Alcohol abuse Father      Social History     Socioeconomic History   • Marital status: Single     Spouse name: Not on file   • Number of children: 4   • Years of education: 12   • Highest education level: Not on file   Occupational History     Employer: Disabled 03/2011   Social Needs   • Financial resource strain: Not on file   • Food insecurity:     Worry: Not on file     Inability: Not on file   • Transportation needs:     Medical: Not on file     Non-medical: Not on file   Tobacco Use   • Smoking status: Current Every Day Smoker       Packs/day: 1.00     Years: 40.00     Pack years: 40.00     Types: Cigarettes   • Smokeless tobacco: Never Used   Substance and Sexual Activity   • Alcohol use: No   • Drug use: No   • Sexual activity: Yes     Partners: Female     Birth control/protection: Condom   Lifestyle   • Physical activity:     Days per week: Not on file     Minutes per session: Not on file   • Stress: Not on file   Relationships   • Social connections:     Talks on phone: Not on file     Gets together: Not on file     Attends Mandaen service: Not on file     Active member of club or organization: Not on file     Attends meetings of clubs or organizations: Not on file     Relationship status: Not on file   • Intimate partner violence:     Fear of current or ex partner: Not on file     Emotionally abused: Not on file     Physically abused: Not on file     Forced sexual activity: Not on file   Other Topics Concern   •  Service Yes     Comment: was in the [employer]   • Blood Transfusions No   • Caffeine Concern Yes   • Occupational Exposure No   • Hobby Hazards No   • Sleep Concern No   • Stress Concern Yes   • Weight Concern Yes     Comment: overweight   • Special Diet Yes   • Back Care No   • Exercise Yes   • Bike Helmet Yes   • Seat Belt Yes   • Self-Exams No   Social History Narrative   • Not on file     Allergies   Allergen Reactions   • Vicodin [Hydrocodone-Acetaminophen]      itch     Outpatient Encounter Medications as of 12/17/2019   Medication Sig Dispense Refill   • ammonium lactate (LAC-HYDRIN) 12 % Lotion RUB IN THOROUGHLY TO AFFECTED AREA TWICE DAILY 400 g 0   • levothyroxine (SYNTHROID) 100 MCG Tab Take 100 mcg by mouth Every morning on an empty stomach.     • vitamin D, Ergocalciferol, (DRISDOL) 08476 units Cap capsule Take 1 Cap by mouth every 7 days. 12 Cap 2   • TRESIBA FLEXTOUCH 200 UNIT/ML Solution Pen-injector Inject 72 Units as instructed every day. 400 mL 2   • TRULICITY 0.75 MG/0.5ML Solution Pen-injector Inject  0.75 mg as instructed every 7 days. 4 PEN 2   • metformin (GLUCOPHAGE) 1000 MG tablet Take 1 Tab by mouth 2 times a day, with meals. 180 Tab 0   • furosemide (LASIX) 40 MG Tab Take 1 Tab by mouth every day. 90 Tab 3   • potassium chloride (KLOR-CON) 20 MEQ Pack Take 1 Packet by mouth 2 times a day. 90 Packet 3   • Insulin Pen Needle (PEN NEEDLES) 32G X 5 MM Misc by Does not apply route.     • [DISCONTINUED] insulin lispro, Human, (HUMALOG KWIKPEN) 100 UNIT/ML injection PEN Inject  as instructed 3 times a day before meals. Inject 0-22u by subcutaneous route before each meal (TID) Max 88u day     • [DISCONTINUED] hydroCHLOROthiazide (HYDRODIURIL) 25 MG Tab Take 25 mg by mouth every day.     • Blood Glucose Meter Kit Test blood sugar as recommended by provider. Abbott Freestyle Lite blood glucose monitoring kit. 1 Kit 0   • Blood Glucose Test Strips Use one Abbott Freestyle Lite strip to test blood sugar three times daily. 100 Strip 0   • Lancets Use one Abbott Freestyle Lite lancet to test blood sugar three times daily. 100 Each 0   • [DISCONTINUED] levothyroxine (SYNTHROID) 75 MCG Tab Take 1 Tab by mouth Every morning on an empty stomach. (Patient not taking: Reported on 12/5/2019) 30 Tab 2   • ONE TOUCH ULTRA TEST strip 1 Strip by Other route 3 times a day. ICD 10 E11.00 (Patient taking differently: 1 Each by Other route 3 times a day. ICD 10 E11.00  Indications: one touch delica 33 gauge) 100 Strip 3   • [DISCONTINUED] escitalopram (LEXAPRO) 10 MG Tab Take 1 Tab by mouth every day. (Patient not taking: Reported on 11/5/2019) 30 Tab 1   • Blood Glucose Monitoring Suppl Supplies Misc Test twice daily, fasting and 2 hours post prandial 100 Strip 3     No facility-administered encounter medications on file as of 12/17/2019.      Review of Systems   Constitutional: Positive for malaise/fatigue. Negative for chills, fever and weight loss.   HENT: Negative.  Negative for hearing loss.    Eyes: Negative.  Negative for  "blurred vision and double vision.   Respiratory: Negative.  Negative for cough and shortness of breath.    Cardiovascular: Negative.  Negative for chest pain, palpitations, claudication and leg swelling.   Gastrointestinal: Negative.  Negative for abdominal pain, nausea and vomiting.   Genitourinary: Negative.  Negative for dysuria and urgency.   Musculoskeletal: Negative.  Negative for joint pain and myalgias.   Skin: Negative.  Negative for itching and rash.   Neurological: Negative.  Negative for dizziness, focal weakness, weakness and headaches.   Endo/Heme/Allergies: Negative.  Does not bruise/bleed easily.   Psychiatric/Behavioral: Negative.  Negative for depression. The patient is not nervous/anxious.         Objective:   /86 (BP Location: Right arm, Patient Position: Sitting, BP Cuff Size: Adult)   Pulse (!) 103   Temp 36.3 °C (97.4 °F) (Temporal)   Resp 20   Ht 1.93 m (6' 4\")   Wt (!) 147.4 kg (325 lb)   SpO2 95%   BMI 39.56 kg/m²      Physical Exam   Constitutional: He is oriented to person, place, and time. He appears well-developed and well-nourished.   HENT:   Head: Normocephalic and atraumatic.   Eyes: EOM are normal.   Neck: No JVD present.   Pulmonary/Chest: Effort normal and breath sounds normal.   Abdominal:   No hepatosplenomegaly.   Musculoskeletal: Normal range of motion.   Lymphadenopathy:     He has no cervical adenopathy.   Neurological: He is alert and oriented to person, place, and time.   Skin: Skin is warm and dry.   Psychiatric: He has a normal mood and affect.     CARDIAC STUDIES/PROCEDURES:    EKG performed on (12/17) was reviewed: EKG personally interpreted shows atrial fibrillation with rapid ventricular response.    Assessment:     1. Chronic atrial fibrillation     2. Diastolic congestive heart failure, unspecified HF chronicity (HCC)     3. Mixed hyperlipidemia     4. Tobacco abuse       Medical Decision Making:  Today's Assessment / Status / Plan:     1. Atrial " fibrillation: He is in new onset atrial fibrillation with rapid ventricular response of unknown duration. I have recommended him to be evaluated and treated at nearest emergency room as this is beyond the capabilities of telemedicine. He will need to be started on chronic anticoagulation therapy for stroke prevention.  2. Acute on chronic unspecified congestive heart failure: The overall volume status is elevated. We will have him diuresed at ER. He will need an echocardiogram.  3. Hyperlipidemia: He is doing well on statin therapy without myalgia symptoms.  4. Chronic tobacco use: Smoking cessation recommended with discussions of health effects and plans for over 3 minutes.    We will follow up in one month.    Thank you for this consult.      No Recipients

## 2019-12-17 NOTE — PROGRESS NOTES
Chief Complaint   Patient presents with   • Hyperlipidemia       Subjective:   Douglas Landa Jr. is a 54 y.o. male who presents today as a Tele-medicine consult from Roxane Sandhu for cardiac evaluation.    Thank you for allowing me to evaluate Mr. Landa, who as you know is a 54 year old male with hyperlipidemia, chronic tobacco abuse and strong family history of coronary artery disease. He is clinically doing well. He admits to fatigue. He denies chest pain, shortness of breath, palpitations, nausea/vomiting or diaphoresis. He continues to smoke.    Past Medical History:   Diagnosis Date   • Anxiety    • Congestive heart failure (HCC)    • Depression    • Diabetes    • Hep C w/o coma, chronic (HCC)    • Hyperlipidemia    • Insomnia    • Neuropathy of foot 6/10/2015   • Thyroid disorder      Past Surgical History:   Procedure Laterality Date   • HERNIA REPAIR  2015    umbilical repair   • ENDOSCOPY PROCEDURE  6/10/2014    Performed by Frantz Hidalgo M.D. at SURGERY [facility]   • ABDOMINAL EXPLORATION     • OPEN REDUCTION       Family History   Problem Relation Age of Onset   • Heart Disease Father    • Alcohol abuse Father      Social History     Socioeconomic History   • Marital status: Single     Spouse name: Not on file   • Number of children: 4   • Years of education: 12   • Highest education level: Not on file   Occupational History     Employer: Disabled 03/2011   Social Needs   • Financial resource strain: Not on file   • Food insecurity:     Worry: Not on file     Inability: Not on file   • Transportation needs:     Medical: Not on file     Non-medical: Not on file   Tobacco Use   • Smoking status: Current Every Day Smoker     Packs/day: 1.00     Years: 40.00     Pack years: 40.00     Types: Cigarettes   • Smokeless tobacco: Never Used   Substance and Sexual Activity   • Alcohol use: No   • Drug use: No   • Sexual activity: Yes     Partners: Female     Birth control/protection: Condom    Lifestyle   • Physical activity:     Days per week: Not on file     Minutes per session: Not on file   • Stress: Not on file   Relationships   • Social connections:     Talks on phone: Not on file     Gets together: Not on file     Attends Faith service: Not on file     Active member of club or organization: Not on file     Attends meetings of clubs or organizations: Not on file     Relationship status: Not on file   • Intimate partner violence:     Fear of current or ex partner: Not on file     Emotionally abused: Not on file     Physically abused: Not on file     Forced sexual activity: Not on file   Other Topics Concern   •  Service Yes     Comment: was in the [employer]   • Blood Transfusions No   • Caffeine Concern Yes   • Occupational Exposure No   • Hobby Hazards No   • Sleep Concern No   • Stress Concern Yes   • Weight Concern Yes     Comment: overweight   • Special Diet Yes   • Back Care No   • Exercise Yes   • Bike Helmet Yes   • Seat Belt Yes   • Self-Exams No   Social History Narrative   • Not on file     Allergies   Allergen Reactions   • Vicodin [Hydrocodone-Acetaminophen]      itch     Outpatient Encounter Medications as of 12/17/2019   Medication Sig Dispense Refill   • ammonium lactate (LAC-HYDRIN) 12 % Lotion RUB IN THOROUGHLY TO AFFECTED AREA TWICE DAILY 400 g 0   • levothyroxine (SYNTHROID) 100 MCG Tab Take 100 mcg by mouth Every morning on an empty stomach.     • vitamin D, Ergocalciferol, (DRISDOL) 81556 units Cap capsule Take 1 Cap by mouth every 7 days. 12 Cap 2   • TRESIBA FLEXTOUCH 200 UNIT/ML Solution Pen-injector Inject 72 Units as instructed every day. 400 mL 2   • TRULICITY 0.75 MG/0.5ML Solution Pen-injector Inject 0.75 mg as instructed every 7 days. 4 PEN 2   • metformin (GLUCOPHAGE) 1000 MG tablet Take 1 Tab by mouth 2 times a day, with meals. 180 Tab 0   • furosemide (LASIX) 40 MG Tab Take 1 Tab by mouth every day. 90 Tab 3   • potassium chloride (KLOR-CON) 20 MEQ Pack Take  1 Packet by mouth 2 times a day. 90 Packet 3   • Insulin Pen Needle (PEN NEEDLES) 32G X 5 MM Misc by Does not apply route.     • [DISCONTINUED] insulin lispro, Human, (HUMALOG KWIKPEN) 100 UNIT/ML injection PEN Inject  as instructed 3 times a day before meals. Inject 0-22u by subcutaneous route before each meal (TID) Max 88u day     • [DISCONTINUED] hydroCHLOROthiazide (HYDRODIURIL) 25 MG Tab Take 25 mg by mouth every day.     • Blood Glucose Meter Kit Test blood sugar as recommended by provider. Abbott Freestyle Lite blood glucose monitoring kit. 1 Kit 0   • Blood Glucose Test Strips Use one Abbott Freestyle Lite strip to test blood sugar three times daily. 100 Strip 0   • Lancets Use one Abbott Freestyle Lite lancet to test blood sugar three times daily. 100 Each 0   • [DISCONTINUED] levothyroxine (SYNTHROID) 75 MCG Tab Take 1 Tab by mouth Every morning on an empty stomach. (Patient not taking: Reported on 12/5/2019) 30 Tab 2   • ONE TOUCH ULTRA TEST strip 1 Strip by Other route 3 times a day. ICD 10 E11.00 (Patient taking differently: 1 Each by Other route 3 times a day. ICD 10 E11.00  Indications: one touch delica 33 gauge) 100 Strip 3   • [DISCONTINUED] escitalopram (LEXAPRO) 10 MG Tab Take 1 Tab by mouth every day. (Patient not taking: Reported on 11/5/2019) 30 Tab 1   • Blood Glucose Monitoring Suppl Supplies Misc Test twice daily, fasting and 2 hours post prandial 100 Strip 3     No facility-administered encounter medications on file as of 12/17/2019.      Review of Systems   Constitutional: Positive for malaise/fatigue. Negative for chills, fever and weight loss.   HENT: Negative.  Negative for hearing loss.    Eyes: Negative.  Negative for blurred vision and double vision.   Respiratory: Negative.  Negative for cough and shortness of breath.    Cardiovascular: Negative.  Negative for chest pain, palpitations, claudication and leg swelling.   Gastrointestinal: Negative.  Negative for abdominal pain, nausea  "and vomiting.   Genitourinary: Negative.  Negative for dysuria and urgency.   Musculoskeletal: Negative.  Negative for joint pain and myalgias.   Skin: Negative.  Negative for itching and rash.   Neurological: Negative.  Negative for dizziness, focal weakness, weakness and headaches.   Endo/Heme/Allergies: Negative.  Does not bruise/bleed easily.   Psychiatric/Behavioral: Negative.  Negative for depression. The patient is not nervous/anxious.         Objective:   /86 (BP Location: Right arm, Patient Position: Sitting, BP Cuff Size: Adult)   Pulse (!) 103   Temp 36.3 °C (97.4 °F) (Temporal)   Resp 20   Ht 1.93 m (6' 4\")   Wt (!) 147.4 kg (325 lb)   SpO2 95%   BMI 39.56 kg/m²     Physical Exam   Constitutional: He is oriented to person, place, and time. He appears well-developed and well-nourished.   HENT:   Head: Normocephalic and atraumatic.   Eyes: EOM are normal.   Neck: No JVD present.   Pulmonary/Chest: Effort normal and breath sounds normal.   Abdominal:   No hepatosplenomegaly.   Musculoskeletal: Normal range of motion.   Lymphadenopathy:     He has no cervical adenopathy.   Neurological: He is alert and oriented to person, place, and time.   Skin: Skin is warm and dry.   Psychiatric: He has a normal mood and affect.     CARDIAC STUDIES/PROCEDURES:    EKG performed on (12/17) was reviewed: EKG personally interpreted shows atrial fibrillation with rapid ventricular response.    Assessment:     1. Chronic atrial fibrillation     2. Diastolic congestive heart failure, unspecified HF chronicity (HCC)     3. Mixed hyperlipidemia     4. Tobacco abuse       Medical Decision Making:  Today's Assessment / Status / Plan:     1. Atrial fibrillation: He is in new onset atrial fibrillation with rapid ventricular response of unknown duration. I have recommended him to be evaluated and treated at nearest emergency room as this is beyond the capabilities of telemedicine. He will need to be started on chronic " anticoagulation therapy for stroke prevention.  2. Acute on chronic unspecified congestive heart failure: The overall volume status is elevated. We will have him diuresed at ER. He will need an echocardiogram.  3. Hyperlipidemia: He is doing well on statin therapy without myalgia symptoms.  4. Chronic tobacco use: Smoking cessation recommended with discussions of health effects and plans for over 3 minutes.    We will follow up in one month.    Thank you for this consult.

## 2019-12-18 ENCOUNTER — ANCILLARY PROCEDURE (OUTPATIENT)
Dept: CARDIOLOGY | Facility: IMAGING CENTER | Age: 54
End: 2019-12-18
Attending: INTERNAL MEDICINE
Payer: MEDICARE

## 2019-12-18 ENCOUNTER — OFFICE VISIT (OUTPATIENT)
Dept: CARDIOLOGY | Facility: PHYSICIAN GROUP | Age: 54
End: 2019-12-18
Payer: MEDICARE

## 2019-12-18 VITALS
HEIGHT: 76 IN | SYSTOLIC BLOOD PRESSURE: 130 MMHG | WEIGHT: 315 LBS | OXYGEN SATURATION: 94 % | HEART RATE: 94 BPM | DIASTOLIC BLOOD PRESSURE: 80 MMHG | BODY MASS INDEX: 38.36 KG/M2

## 2019-12-18 DIAGNOSIS — I50.30 DIASTOLIC CONGESTIVE HEART FAILURE, UNSPECIFIED HF CHRONICITY (HCC): ICD-10-CM

## 2019-12-18 DIAGNOSIS — R06.09 DOE (DYSPNEA ON EXERTION): ICD-10-CM

## 2019-12-18 DIAGNOSIS — Z72.0 TOBACCO ABUSE: ICD-10-CM

## 2019-12-18 DIAGNOSIS — I48.11 LONGSTANDING PERSISTENT ATRIAL FIBRILLATION (HCC): ICD-10-CM

## 2019-12-18 DIAGNOSIS — Z79.4 TYPE 2 DIABETES MELLITUS WITH HYPEROSMOLARITY WITHOUT COMA, WITH LONG-TERM CURRENT USE OF INSULIN (HCC): ICD-10-CM

## 2019-12-18 DIAGNOSIS — E78.2 MIXED HYPERLIPIDEMIA: ICD-10-CM

## 2019-12-18 DIAGNOSIS — Z82.49 FAMILY HISTORY OF CORONARY ARTERY DISEASE IN FATHER: ICD-10-CM

## 2019-12-18 DIAGNOSIS — E11.00 TYPE 2 DIABETES MELLITUS WITH HYPEROSMOLARITY WITHOUT COMA, WITH LONG-TERM CURRENT USE OF INSULIN (HCC): ICD-10-CM

## 2019-12-18 DIAGNOSIS — R60.0 LOCALIZED EDEMA: ICD-10-CM

## 2019-12-18 DIAGNOSIS — I87.2 CHRONIC VENOUS INSUFFICIENCY: ICD-10-CM

## 2019-12-18 DIAGNOSIS — Z79.01 CHRONIC ANTICOAGULATION: ICD-10-CM

## 2019-12-18 LAB
LV EJECT FRACT  99904: 60
LV EJECT FRACT MOD 2C 99903: 74.69
LV EJECT FRACT MOD 4C 99902: 70.37
LV EJECT FRACT MOD BP 99901: 72.61

## 2019-12-18 PROCEDURE — 93306 TTE W/DOPPLER COMPLETE: CPT | Performed by: INTERNAL MEDICINE

## 2019-12-18 PROCEDURE — 99215 OFFICE O/P EST HI 40 MIN: CPT | Performed by: INTERNAL MEDICINE

## 2019-12-18 RX ORDER — FUROSEMIDE 40 MG/1
40 TABLET ORAL DAILY
Qty: 90 TAB | Refills: 3 | Status: SHIPPED
Start: 2019-12-18 | End: 2020-06-04

## 2019-12-18 RX ORDER — POTASSIUM CHLORIDE 1.5 G/1.58G
20 POWDER, FOR SOLUTION ORAL DAILY
Qty: 90 EACH | Refills: 3 | Status: SHIPPED
Start: 2019-12-18 | End: 2019-12-30

## 2019-12-18 RX ORDER — METOPROLOL TARTRATE 100 MG/1
50 TABLET ORAL 2 TIMES DAILY
Qty: 180 TAB | Refills: 3 | Status: SHIPPED
Start: 2019-12-18 | End: 2019-12-30

## 2019-12-18 RX ORDER — POTASSIUM CHLORIDE 1.5 G/1.58G
20 POWDER, FOR SOLUTION ORAL 2 TIMES DAILY
Qty: 90 PACKET | Refills: 3 | Status: SHIPPED | OUTPATIENT
Start: 2019-12-18 | End: 2019-12-18 | Stop reason: SDUPTHER
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
## 2019-12-18 NOTE — PATIENT INSTRUCTIONS
-Your heart rate is too fast, it is irregular, called atrial fibrillation  -When the heart rate goes too fast it can cause fluid retention  -Start taking lasix 40 mg with potassium every morning  -Start taking metoprolol 50 mg AM and PM for 3 days, then increase to 100 mg AM and PM  -ECG next week to look at the heart rate  -We will probably eventually need to do a cardioversion where we shock your heart and reset it to a normal rhythm, this is done in [place] at the [facility] hospital. You would have to be on a blood thinner at that time  -Aspirin 325 mg daily  -Symptoms of a stroke is a 911 emergency, They can start the code stroke protocol before you get to the hospital, if you cannot move arm/leg, can't talk, face looks droopy  -Echo-heart pictures, tells me about the heart pump function  -Non fasting blood work in 1 week after being on lasix and potassium every day

## 2019-12-20 ENCOUNTER — TELEPHONE (OUTPATIENT)
Dept: VASCULAR LAB | Facility: MEDICAL CENTER | Age: 54
End: 2019-12-20

## 2019-12-20 ENCOUNTER — TELEPHONE (OUTPATIENT)
Dept: URGENT CARE | Facility: CLINIC | Age: 54
End: 2019-12-20

## 2019-12-20 NOTE — TELEPHONE ENCOUNTER
Received pharmacotherapy referral for DM2 from RHODA Pelletier on 11/22/19.    Pt cancelled appt that was scheduled on 12/18/19. Please call to schedule in the next 1-4 weeks     Insurance: Medicare  PCP: [facility]  Locations to be seen: [place] or [facility] at [phone], fax [phone]    Rosaura Collier, RhysD

## 2019-12-20 NOTE — TELEPHONE ENCOUNTER
Phone Number Called: [phone] (home)       Call outcome: no answer/no voice mail    Message: unable to leave msg

## 2019-12-20 NOTE — TELEPHONE ENCOUNTER
Pt LVM 12/17 @ 2PM to speak to Roxane about his cardiology appointment. They found that he has AFIB and would like to talk about it. PH: [phone]

## 2019-12-23 ENCOUNTER — TELEPHONE (OUTPATIENT)
Dept: CARDIOLOGY | Facility: MEDICAL CENTER | Age: 54
End: 2019-12-23

## 2019-12-23 NOTE — TELEPHONE ENCOUNTER
Phone Number Called: [phone] (home)       Call outcome: unable to leave a msg.  phone rang once then disconnected    Message: unable to leave a msg

## 2019-12-23 NOTE — TELEPHONE ENCOUNTER
Spoke with patient regarding his apt. Patient will have labs done prior to his apt. Also patient requested to cxl his pat for 12/24/2019 EKG and will like to see if we could do a EKG during his visit on 12/30/2019.

## 2019-12-30 ENCOUNTER — OFFICE VISIT (OUTPATIENT)
Dept: CARDIOLOGY | Facility: MEDICAL CENTER | Age: 54
End: 2019-12-30
Payer: MEDICARE

## 2019-12-30 ENCOUNTER — TELEPHONE (OUTPATIENT)
Dept: CARDIOLOGY | Facility: MEDICAL CENTER | Age: 54
End: 2019-12-30

## 2019-12-30 ENCOUNTER — APPOINTMENT (OUTPATIENT)
Dept: VASCULAR LAB | Facility: MEDICAL CENTER | Age: 54
End: 2019-12-30
Attending: INTERNAL MEDICINE
Payer: MEDICARE

## 2019-12-30 VITALS
BODY MASS INDEX: 38.36 KG/M2 | HEIGHT: 76 IN | WEIGHT: 315 LBS | SYSTOLIC BLOOD PRESSURE: 112 MMHG | DIASTOLIC BLOOD PRESSURE: 64 MMHG | HEART RATE: 88 BPM | OXYGEN SATURATION: 94 %

## 2019-12-30 DIAGNOSIS — Z72.0 TOBACCO ABUSE: ICD-10-CM

## 2019-12-30 DIAGNOSIS — E78.2 MIXED HYPERLIPIDEMIA: ICD-10-CM

## 2019-12-30 DIAGNOSIS — Z79.4 TYPE 2 DIABETES MELLITUS WITH HYPEROSMOLARITY WITHOUT COMA, WITH LONG-TERM CURRENT USE OF INSULIN (HCC): ICD-10-CM

## 2019-12-30 DIAGNOSIS — I48.20 CHRONIC ATRIAL FIBRILLATION (HCC): Primary | ICD-10-CM

## 2019-12-30 DIAGNOSIS — E11.00 TYPE 2 DIABETES MELLITUS WITH HYPEROSMOLARITY WITHOUT COMA, WITH LONG-TERM CURRENT USE OF INSULIN (HCC): ICD-10-CM

## 2019-12-30 LAB — EKG IMPRESSION: NORMAL

## 2019-12-30 PROCEDURE — 99214 OFFICE O/P EST MOD 30 MIN: CPT | Performed by: NURSE PRACTITIONER

## 2019-12-30 PROCEDURE — 93000 ELECTROCARDIOGRAM COMPLETE: CPT | Performed by: INTERNAL MEDICINE

## 2019-12-30 RX ORDER — LEVOTHYROXINE SODIUM 0.07 MG/1
75 TABLET ORAL
COMMUNITY
End: 2020-04-06

## 2019-12-30 RX ORDER — METOPROLOL TARTRATE 100 MG/1
100 TABLET ORAL 2 TIMES DAILY
Qty: 180 TAB | Refills: 3 | Status: SHIPPED | OUTPATIENT
Start: 2019-12-30 | End: 2020-06-04

## 2019-12-30 RX ORDER — METOPROLOL TARTRATE 100 MG/1
100 TABLET ORAL 2 TIMES DAILY
COMMUNITY
End: 2019-12-30

## 2019-12-30 RX ORDER — ASPIRIN 81 MG/1
81 TABLET, CHEWABLE ORAL DAILY
Qty: 100 TAB | Refills: 3 | Status: SHIPPED | OUTPATIENT
Start: 2019-12-30 | End: 2020-06-04

## 2019-12-30 RX ORDER — POTASSIUM CHLORIDE 20 MEQ/1
20 TABLET, EXTENDED RELEASE ORAL DAILY
Qty: 90 TAB | Refills: 3 | Status: SHIPPED | OUTPATIENT
Start: 2019-12-30 | End: 2020-06-04

## 2019-12-30 ASSESSMENT — ENCOUNTER SYMPTOMS
ABDOMINAL PAIN: 0
MYALGIAS: 0
PALPITATIONS: 0
ORTHOPNEA: 0
CLAUDICATION: 0
PND: 0
SHORTNESS OF BREATH: 0
DIZZINESS: 0
COUGH: 0
WEAKNESS: 0
NERVOUS/ANXIOUS: 1

## 2019-12-30 NOTE — PROGRESS NOTES
"Chief Complaint   Patient presents with   • Atrial Fibrillation     follow up echo results       Subjective:   Douglas \"Maurice\"  JOLIE Landa Jr. is a 54 y.o. male who presents today to follow-up on atrial fibrillation.  He was last seen on 2019 by Dr. Bahena.  He was found to be in atrial fibrillation.  He was started on aspirin 325 mg and given metoprolol 100 mg twice a day.    He is very anxious about having been diagnosed with atrial fibrillation.  He denies any palpitations but does feel short of breath especially with exertion.  He does not exercise regularly.  He was also given Lasix for chronic lower extremity edema but is not felt to have congestive heart failure.  He is a diabetic as he is on insulin and metformin.  He states he is not a diabetic because his A1c was previously 14 is now 6.    Unfortunately he continues to smoke and knows that he should quit.  He does not have any immediate plans to quit but would like to eventually.    Past Medical History:   Diagnosis Date   • Anxiety    • Congestive heart failure (HCC)    • Depression    • Diabetes    • Hep C w/o coma, chronic (HCC)    • Hyperlipidemia    • Insomnia    • Neuropathy of foot 6/10/2015   • Thyroid disorder      Past Surgical History:   Procedure Laterality Date   • HERNIA REPAIR      umbilical repair   • ENDOSCOPY PROCEDURE  6/10/2014    Performed by Frantz Hidalgo M.D. at SURGERY [facility]   • ABDOMINAL EXPLORATION     • OPEN REDUCTION       Family History   Problem Relation Age of Onset   • Alcohol abuse Father    • Heart Disease Father          of MI at 73   • Other Sister          of sepsis, complications from thoracentesis   • Heart Disease Paternal Grandfather          of MI ~70s     Social History     Socioeconomic History   • Marital status: Single     Spouse name: Not on file   • Number of children: 4   • Years of education: 12   • Highest education level: Not on file   Occupational History     " Employer: Disabled 03/2011   Social Needs   • Financial resource strain: Not on file   • Food insecurity:     Worry: Not on file     Inability: Not on file   • Transportation needs:     Medical: Not on file     Non-medical: Not on file   Tobacco Use   • Smoking status: Current Every Day Smoker     Packs/day: 1.00     Years: 40.00     Pack years: 40.00     Types: Cigarettes   • Smokeless tobacco: Never Used   • Tobacco comment: wants to quit.   Substance and Sexual Activity   • Alcohol use: No   • Drug use: No   • Sexual activity: Yes     Partners: Female     Birth control/protection: Condom   Lifestyle   • Physical activity:     Days per week: Not on file     Minutes per session: Not on file   • Stress: Not on file   Relationships   • Social connections:     Talks on phone: Not on file     Gets together: Not on file     Attends [employer] service: Not on file     Active member of club or organization: Not on file     Attends meetings of clubs or organizations: Not on file     Relationship status: Not on file   • Intimate partner violence:     Fear of current or ex partner: Not on file     Emotionally abused: Not on file     Physically abused: Not on file     Forced sexual activity: Not on file   Other Topics Concern   •  Service Yes     Comment: was in the [employer]   • Blood Transfusions No   • Caffeine Concern Yes   • Occupational Exposure No   • Hobby Hazards No   • Sleep Concern No   • Stress Concern Yes   • Weight Concern Yes     Comment: overweight   • Special Diet Yes   • Back Care No   • Exercise Yes   • Bike Helmet Yes   • Seat Belt Yes   • Self-Exams No   Social History Narrative   • Not on file     Allergies   Allergen Reactions   • Vicodin [Hydrocodone-Acetaminophen]      itch     Outpatient Encounter Medications as of 12/30/2019   Medication Sig Dispense Refill   • levothyroxine (SYNTHROID) 75 MCG Tab Take 75 mcg by mouth Every morning on an empty stomach.     • metoprolol (LOPRESSOR) 100 MG Tab Take  1 Tab by mouth 2 times a day. 180 Tab 3   • aspirin (ASA) 81 MG Chew Tab chewable tablet Take 1 Tab by mouth every day. 100 Tab 3   • potassium chloride SA (KDUR) 20 MEQ Tab CR Take 1 Tab by mouth every day. 90 Tab 3   • furosemide (LASIX) 40 MG Tab Take 1 Tab by mouth every day. 90 Tab 3   • ammonium lactate (LAC-HYDRIN) 12 % Lotion RUB IN THOROUGHLY TO AFFECTED AREA TWICE DAILY 400 g 0   • vitamin D, Ergocalciferol, (DRISDOL) 39906 units Cap capsule Take 1 Cap by mouth every 7 days. 12 Cap 2   • TRESIBA FLEXTOUCH 200 UNIT/ML Solution Pen-injector Inject 72 Units as instructed every day. 400 mL 2   • TRULICITY 0.75 MG/0.5ML Solution Pen-injector Inject 0.75 mg as instructed every 7 days. 4 PEN 2   • metformin (GLUCOPHAGE) 1000 MG tablet Take 1 Tab by mouth 2 times a day, with meals. 180 Tab 0   • [DISCONTINUED] metoprolol (LOPRESSOR) 100 MG Tab Take 100 mg by mouth 2 times a day.     • [DISCONTINUED] potassium chloride (KLOR-CON) 20 MEQ Pack Take 1 Packet by mouth every day. 90 Each 3   • [DISCONTINUED] metoprolol (LOPRESSOR) 100 MG Tab Take 0.5 Tabs by mouth 2 times a day. (Patient not taking: Reported on 12/30/2019) 180 Tab 3   • [DISCONTINUED] aspirin EC (ECOTRIN) 325 MG Tablet Delayed Response Take 1 Tab by mouth every day. 60 Tab    • Insulin Pen Needle (PEN NEEDLES) 32G X 5 MM Misc by Does not apply route.     • [DISCONTINUED] levothyroxine (SYNTHROID) 100 MCG Tab Take 100 mcg by mouth Every morning on an empty stomach.     • Lancets Use one Abbott Freestyle Lite lancet to test blood sugar three times daily. 100 Each 0   • [DISCONTINUED] Blood Glucose Meter Kit Test blood sugar as recommended by provider. Abbott Freestyle Lite blood glucose monitoring kit. 1 Kit 0   • [DISCONTINUED] Blood Glucose Test Strips Use one Abbott Freestyle Lite strip to test blood sugar three times daily. 100 Strip 0   • ONE TOUCH ULTRA TEST strip 1 Strip by Other route 3 times a day. ICD 10 E11.00 (Patient taking differently: 1  "Each by Other route 3 times a day. ICD 10 E11.00  Indications: one touch delica 33 gauge) 100 Strip 3   • [DISCONTINUED] Blood Glucose Monitoring Suppl Supplies Misc Test twice daily, fasting and 2 hours post prandial 100 Strip 3     No facility-administered encounter medications on file as of 12/30/2019.      Review of Systems   Constitutional: Negative for malaise/fatigue.   Respiratory: Negative for cough and shortness of breath.    Cardiovascular: Positive for leg swelling (Chronic.). Negative for chest pain, palpitations, orthopnea, claudication and PND.   Gastrointestinal: Negative for abdominal pain.   Musculoskeletal: Negative for myalgias.   Neurological: Negative for dizziness and weakness.   Psychiatric/Behavioral: The patient is nervous/anxious.         Objective:   /64 (BP Location: Left arm, Patient Position: Sitting)   Pulse 88   Ht 1.93 m (6' 4\")   Wt (!) 148.3 kg (327 lb)   SpO2 94%   BMI 39.80 kg/m²     Physical Exam   Constitutional: He is oriented to person, place, and time. He appears well-developed and well-nourished.   HENT:   Head: Normocephalic.   Eyes: EOM are normal.   Neck: Normal range of motion. No JVD present.   Cardiovascular: Normal rate and regular rhythm. Exam reveals no friction rub.   No murmur heard.  Pulmonary/Chest: Effort normal.   Abdominal: Soft. Bowel sounds are normal.   Musculoskeletal:         General: No edema.   Neurological: He is alert and oriented to person, place, and time.   Skin: Skin is warm and dry.   Psychiatric: He has a normal mood and affect.     December 18, 2019: Transthoracic Echo Report  CONCLUSIONS  No prior study is available for comparison.   Fair quality study.  Left ventricular ejection fraction is visually estimated to be 55-70%,   with [id number] variability due to rapid atrial fibrillation.  Normal left atrial size.  Mild mitral annular calcification.  The aortic valve is not well visualized.  Trace tricuspid " regurgitation.  Estimated right ventricular systolic pressure  is 25 mmHg.  Consider underfilled state such as dehydration or anemia.       November 11, 2019: Lipids: Cholesterol 174, triglycerides 119, HDL 27, .      Today: EKG is atrial fibrillation rate of 111 bpm.  Heart rate in the office was generally lower however.      Assessment:     1. Chronic atrial fibrillation  EKG    EC-CRISTI W/O CONT    CL-CARDIOVERSION   2. Mixed hyperlipidemia     3. Tobacco abuse     4. Type 2 diabetes mellitus with hyperosmolarity without coma, with long-term current use of insulin ([facility])         Medical Decision Making:  Today's Assessment / Status / Plan:   Atrial fibrillation: He has moderate rate control despite the heart rate on his EKG.  Most of the time in the office today his heart rate was 80 to 90 bpm.  He is on aspirin 325 mg which is not the preferred dose currently.  I will change him to aspirin 81 mg and he will take it with food as he had some nausea with the higher dose of aspirin.    Per Dr. Leann Bahena, she wanted him on aspirin instead of full anticoagulation.  She would like him to have a CRISTI and cardioversion which I will go ahead and order.  Risks and benefits were explained to the patient.    Hyperlipidemia: Patient is diabetic therefore his LDL should be less than 100.  I will leave this up to other providers to treat.    Tobacco abuse: Unfortunately he continues to smoke and would like to quit eventually.  However he does not feel ready at this time.  He is encouraged to stop smoking as he is diabetic which would worsen vascular disease.    Diabetes: He appears to have good diabetic control as his hemoglobin A1c is now 6.4.  He will follow with other providers.    He will follow-up after his CRISTI and cardioversion.  He will follow-up sooner if problems.    Collaborating Provider: Dr. Pickering.    Please note that this dictation was created using voice recognition software. I have made every reasonable  attempt to correct obvious errors, but it is possible there are errors of grammar and possibly content that I did not discover before finalizing the note.

## 2019-12-31 ENCOUNTER — TELEPHONE (OUTPATIENT)
Dept: CARDIOLOGY | Facility: MEDICAL CENTER | Age: 54
End: 2019-12-31

## 2019-12-31 NOTE — TELEPHONE ENCOUNTER
Per patients request due to getting a ride, patient is scheduled on 1-23-20 for a CRISTI/CV with Dr. Bahena with anesthesia. Patient was told to hold lasix,metformin and insulin AM day of procedure and to check in at 10:30 for a 12:30 procedure. H&P was done on 12-30-19 by Rufus Phillips. Pre admit to call patient due to him living out of area.

## 2020-01-06 ENCOUNTER — TELEPHONE (OUTPATIENT)
Dept: VASCULAR LAB | Facility: MEDICAL CENTER | Age: 55
End: 2020-01-06

## 2020-01-06 NOTE — LETTER
January 6, 2020        Douglas Landa Jr.  [address] [address]        Dear Douglas Landa Jr. ,    We have been unsuccessful in our attempts to contact you regarding your Diabetes Clinic referral.  Please contact us if you would like to schedule an appointment for help managing your blood sugars.     Please contact our clinic so we may assist you.  We are open Monday-Friday 8 am until 5 pm.  You may reach our Service at [phone].        Sincerely,         Gus Black, PharmD, [facility]  Pharmacy Clinical Supervisor  [facility]  Outpatient Ambulatory Care Service

## 2020-01-06 NOTE — TELEPHONE ENCOUNTER
Received pharmacotherapy referral for DM2 from RHODA Pelletier on 11/22/19.     Pt cancelled appt that was scheduled on 12/18/19. Unable to establish care, FYI to referring provider, sent pt letter.      Insurance: Medicare  PCP: [facility]  Locations to be seen: [place] or [facility] at [phone], fax [phone]     Rosaura Collier, RhysD

## 2020-01-16 ENCOUNTER — OFFICE VISIT (OUTPATIENT)
Dept: URGENT CARE | Facility: CLINIC | Age: 55
End: 2020-01-16
Payer: MEDICARE

## 2020-01-16 VITALS
DIASTOLIC BLOOD PRESSURE: 82 MMHG | RESPIRATION RATE: 22 BRPM | TEMPERATURE: 97.9 F | OXYGEN SATURATION: 89 % | HEART RATE: 88 BPM | WEIGHT: 315 LBS | BODY MASS INDEX: 38.36 KG/M2 | SYSTOLIC BLOOD PRESSURE: 140 MMHG | HEIGHT: 76 IN

## 2020-01-16 DIAGNOSIS — R94.31 ABNORMAL EKG: ICD-10-CM

## 2020-01-16 DIAGNOSIS — R06.02 SOB (SHORTNESS OF BREATH): ICD-10-CM

## 2020-01-16 PROCEDURE — 99214 OFFICE O/P EST MOD 30 MIN: CPT | Performed by: FAMILY MEDICINE

## 2020-01-16 PROCEDURE — 93000 ELECTROCARDIOGRAM COMPLETE: CPT | Performed by: FAMILY MEDICINE

## 2020-01-16 RX ORDER — ASPIRIN 81 MG/1
324 TABLET, CHEWABLE ORAL ONCE
Status: COMPLETED | OUTPATIENT
Start: 2020-01-16 | End: 2020-01-16

## 2020-01-16 RX ADMIN — ASPIRIN 324 MG: 81 TABLET, CHEWABLE ORAL at 14:00

## 2020-01-16 ASSESSMENT — ENCOUNTER SYMPTOMS: COUGH: 1

## 2020-01-16 NOTE — PROGRESS NOTES
"Subjective:      Douglas Landa Jr. is a 54 y.o. male who presents with Cough (chest congestion, shortness of breath x 2 days)            This is a new problem.  54-year-old male with history of A. Fib, CHF and insulin-dependent diabetes mellitus presenting for 3-day history of worsening shortness of breath and some cough.  Denies any other URI symptoms.  He is scheduled to have cardioversion done through cardiology.  He denies any chest pain.  He does have some orthopnea per history as he cannot fully lay flat and sleep at night.  He also missed taking his water pills(Lasix).  Denies any dizziness or lightheadedness, fever or chills..  He drove himself here.  He denies any history of CAD per se.      Review of Systems   Respiratory: Positive for cough.    All other systems reviewed and are negative.         Objective:   /82   Pulse 88   Temp 36.6 °C (97.9 °F) (Temporal)   Resp (!) 22   Ht 1.93 m (6' 4\")   Wt (!) 147.4 kg (325 lb)   SpO2 89%   BMI 39.56 kg/m²   He was placed on 4 L of oxygen per nasal cannula after which his oxygen improved to 94%  Physical Exam  Constitutional:       Appearance: He is not ill-appearing, toxic-appearing or diaphoretic.   HENT:      Head: Atraumatic.      Right Ear: External ear normal.      Left Ear: External ear normal.      Nose: Nose normal.   Eyes:      Conjunctiva/sclera: Conjunctivae normal.   Cardiovascular:      Rate and Rhythm: Normal rate. Rhythm irregular.      Heart sounds: Murmur present. No friction rub.   Pulmonary:      Effort: Tachypnea present.      Breath sounds: No stridor. Examination of the right-lower field reveals rales. Examination of the left-lower field reveals rales. Rales present. No wheezing or rhonchi.   Skin:     General: Skin is warm.      Coloration: Skin is not jaundiced or pale.   Neurological:      Mental Status: He is alert and oriented to person, place, and time.   Psychiatric:         Mood and Affect: Mood normal.          EKG " shows atrial fibrillation with a rate less than 100 on EKG, scattered PVCs noted.  ST depression noted in the lateral leads.       Assessment/Plan:       1. SOB (shortness of breath)  - aspirin (ASA) chewable tab 324 mg  - EKG - Clinic Performed  -  AMA/Refusal of Treatment    2. Abnormal EKG      Patient is adamant about getting an injection of antibiotic to help his situation but we discussed in length and multiple times that based on his history and physical on presentation he is in overt congestive heart failure and needs to go to the nearest emergency department for further evaluation.  Advised to transport him by EMS which is the standard of care, he declined and signed AMA and his wife came in to pick him up.  Try to contact the [facility] ED 3 times but was not able to go through.

## 2020-01-16 NOTE — PATIENT INSTRUCTIONS
Based on your history and presentation I am concerned that you are in overt congestive heart failure and your EKG also shows signs of strain    Recommend going image immediately to the nearest emergency department for evaluation

## 2020-01-22 NOTE — TELEPHONE ENCOUNTER
Spoke to patient about cancelling his CRISTI/Cv on 1-23-20. Patient wants to talk with Dr. Bahena's nurse in regards to the procedure. Sent message to Devi to call patient and advise.

## 2020-01-22 NOTE — TELEPHONE ENCOUNTER
Daysi crouch a voice message from this patient. He is [facility] to [facility]. He would like to be rescheduled. Please call this patient to reschedule.

## 2020-01-30 ENCOUNTER — TELEPHONE (OUTPATIENT)
Dept: CARDIOLOGY | Facility: MEDICAL CENTER | Age: 55
End: 2020-01-30

## 2020-01-30 NOTE — TELEPHONE ENCOUNTER
----- Message from Lanette Mccann sent at 1/30/2020  9:38 AM PST -----  Regarding: Please call patient  Patient would like for you to call him in regards to him wanting to have his CRISTI/CV done in [place].

## 2020-01-30 NOTE — TELEPHONE ENCOUNTER
Attempted to contact patient 2x.  It appears the phone is answered, but then there is just radio silence.  Unable to leave message.   [employer] message sent.

## 2020-03-05 ENCOUNTER — PATIENT MESSAGE (OUTPATIENT)
Dept: HEALTH INFORMATION MANAGEMENT | Facility: OTHER | Age: 55
End: 2020-03-05

## 2020-03-20 RX ORDER — AMMONIUM LACTATE 12 G/100G
LOTION TOPICAL
Qty: 400 G | Refills: 0 | Status: SHIPPED
Start: 2020-03-20 | End: 2020-06-04

## 2020-04-06 RX ORDER — LEVOTHYROXINE SODIUM 0.07 MG/1
TABLET ORAL
Qty: 30 TAB | Refills: 0 | Status: SHIPPED | OUTPATIENT
Start: 2020-04-06 | End: 2020-05-12

## 2020-04-07 DIAGNOSIS — Z79.4 TYPE 2 DIABETES MELLITUS WITH HYPEROSMOLARITY WITHOUT COMA, WITH LONG-TERM CURRENT USE OF INSULIN (HCC): ICD-10-CM

## 2020-04-07 DIAGNOSIS — E11.00 TYPE 2 DIABETES MELLITUS WITH HYPEROSMOLARITY WITHOUT COMA, WITH LONG-TERM CURRENT USE OF INSULIN (HCC): ICD-10-CM

## 2020-04-07 RX ORDER — DULAGLUTIDE 0.75 MG/.5ML
0.75 INJECTION, SOLUTION SUBCUTANEOUS
Qty: 12 PEN | Refills: 0 | Status: SHIPPED
Start: 2020-04-07 | End: 2020-06-04

## 2020-04-28 ENCOUNTER — TELEPHONE (OUTPATIENT)
Dept: MEDICAL GROUP | Facility: PHYSICIAN GROUP | Age: 55
End: 2020-04-28

## 2020-04-28 NOTE — TELEPHONE ENCOUNTER
Phone Number Called: [phone] (home)      Call outcome: Left message for patient to reschedule     Message: I have called patient reschedule appointment as he has not gotten lab work done yet. He is aware that he had to reschedule appointment. I have will fax lab work over to [facility] he can get it done. Once it is done, he can get a virtual visit done.

## 2020-05-04 ENCOUNTER — TELEPHONE (OUTPATIENT)
Dept: MEDICAL GROUP | Facility: PHYSICIAN GROUP | Age: 55
End: 2020-05-04

## 2020-05-04 NOTE — TELEPHONE ENCOUNTER
Pt called stating that the [facility] lab in [place] never received his lab orders. Advised pt I would call them to confirm fax number and send labs over. Confirmed fax number and re faxed lab orders. I also called to confirm they were received and let pt know that the lab had them and he needed to fast before he had them done. Pt stated that he will have them done tomorrow and thanked me for the call.

## 2020-05-11 ENCOUNTER — TELEPHONE (OUTPATIENT)
Dept: CARDIOLOGY | Facility: MEDICAL CENTER | Age: 55
End: 2020-05-11

## 2020-05-11 NOTE — TELEPHONE ENCOUNTER
Called and left voicemail for patient on   5/11/2020 to remind patient that he has an upcoming appointment with Dr. Bahena on 5/12/2020 at 11:20AM and he needs to do his Comp, CBC, EC Nixon, CL Cardioversion before his next appointment.

## 2020-05-12 ENCOUNTER — TELEPHONE (OUTPATIENT)
Dept: MEDICAL GROUP | Facility: PHYSICIAN GROUP | Age: 55
End: 2020-05-12

## 2020-05-12 ENCOUNTER — TELEPHONE (OUTPATIENT)
Dept: CARDIOLOGY | Facility: MEDICAL CENTER | Age: 55
End: 2020-05-12

## 2020-05-12 RX ORDER — LEVOTHYROXINE SODIUM 0.07 MG/1
TABLET ORAL
Qty: 30 TAB | Refills: 0 | Status: SHIPPED | OUTPATIENT
Start: 2020-05-12 | End: 2020-08-04

## 2020-05-12 NOTE — TELEPHONE ENCOUNTER
"Phone Number Called: [phone] (home)       Call outcome: Left detailed message for patient. Informed to call back with any additional questions.    Message: Per Roxane: Please contact pt and let him know I refilled his medication for this month. Further refills fill be authorized after he gets his labs done. He needs to complete his labs ASAP as I need recent data. I need his thyroid function as well as the rest of the labs. He can come to [facility] and all the orders are already in the system. Thank you\"  "

## 2020-05-12 NOTE — TELEPHONE ENCOUNTER
Called patient 4 time to check in for appt with LS no answer and left messages for call back.  Marked pt as a no show and called and left message for pt to reschedule  appt .

## 2020-05-15 ENCOUNTER — TELEPHONE (OUTPATIENT)
Dept: CARDIOLOGY | Facility: MEDICAL CENTER | Age: 55
End: 2020-05-15

## 2020-05-15 NOTE — TELEPHONE ENCOUNTER
LS    Hello, patient would like labs sent over to [facility] urgent care their fax number is [phone] he would like to have this done prior to his appt with KASISDY on 06/04

## 2020-05-15 NOTE — TELEPHONE ENCOUNTER
Labs reprinted, face sheet, ID and insurance and faxed to provided fax number.     [email or web address] message sent to patient

## 2020-06-04 ENCOUNTER — TELEMEDICINE (OUTPATIENT)
Dept: CARDIOLOGY | Facility: MEDICAL CENTER | Age: 55
End: 2020-06-04
Payer: MEDICARE

## 2020-06-04 VITALS — WEIGHT: 290 LBS | BODY MASS INDEX: 36.06 KG/M2 | HEIGHT: 75 IN

## 2020-06-04 DIAGNOSIS — R06.09 DOE (DYSPNEA ON EXERTION): ICD-10-CM

## 2020-06-04 DIAGNOSIS — I50.30 DIASTOLIC CONGESTIVE HEART FAILURE, UNSPECIFIED HF CHRONICITY (HCC): ICD-10-CM

## 2020-06-04 DIAGNOSIS — I48.11 LONGSTANDING PERSISTENT ATRIAL FIBRILLATION (HCC): ICD-10-CM

## 2020-06-04 DIAGNOSIS — Z82.49 FAMILY HISTORY OF CORONARY ARTERY DISEASE IN FATHER: ICD-10-CM

## 2020-06-04 DIAGNOSIS — Z79.4 TYPE 2 DIABETES MELLITUS WITH HYPEROSMOLARITY WITHOUT COMA, WITH LONG-TERM CURRENT USE OF INSULIN (HCC): ICD-10-CM

## 2020-06-04 DIAGNOSIS — I48.20 CHRONIC ATRIAL FIBRILLATION (HCC): ICD-10-CM

## 2020-06-04 DIAGNOSIS — Z79.01 CHRONIC ANTICOAGULATION: ICD-10-CM

## 2020-06-04 DIAGNOSIS — E78.2 MIXED HYPERLIPIDEMIA: ICD-10-CM

## 2020-06-04 DIAGNOSIS — I87.2 CHRONIC VENOUS INSUFFICIENCY: ICD-10-CM

## 2020-06-04 DIAGNOSIS — E11.00 TYPE 2 DIABETES MELLITUS WITH HYPEROSMOLARITY WITHOUT COMA, WITH LONG-TERM CURRENT USE OF INSULIN (HCC): ICD-10-CM

## 2020-06-04 DIAGNOSIS — R60.0 LOCALIZED EDEMA: ICD-10-CM

## 2020-06-04 DIAGNOSIS — Z72.0 TOBACCO ABUSE: ICD-10-CM

## 2020-06-04 PROCEDURE — 99214 OFFICE O/P EST MOD 30 MIN: CPT | Mod: 95,CR | Performed by: INTERNAL MEDICINE

## 2020-06-04 RX ORDER — SPIRONOLACTONE 25 MG/1
25 TABLET ORAL DAILY
COMMUNITY
End: 2020-06-04 | Stop reason: SDUPTHER

## 2020-06-04 RX ORDER — FUROSEMIDE 40 MG/1
TABLET ORAL
COMMUNITY
End: 2020-06-04 | Stop reason: SDUPTHER

## 2020-06-04 RX ORDER — FUROSEMIDE 40 MG/1
40 TABLET ORAL DAILY
Qty: 100 TAB | Refills: 3 | Status: SHIPPED | OUTPATIENT
Start: 2020-06-04

## 2020-06-04 RX ORDER — POTASSIUM CHLORIDE 20 MEQ/1
20 TABLET, EXTENDED RELEASE ORAL DAILY
Qty: 90 TAB | Refills: 3 | Status: SHIPPED | OUTPATIENT
Start: 2020-06-04

## 2020-06-04 RX ORDER — SPIRONOLACTONE 25 MG/1
25 TABLET ORAL DAILY
Qty: 90 TAB | Refills: 3 | Status: SHIPPED | OUTPATIENT
Start: 2020-06-04 | End: 2023-04-26

## 2020-06-04 RX ORDER — ALUMINUM ZIRCONIUM TRICHLOROHYDREX GLY 0.19 G/G
STICK TOPICAL
COMMUNITY
End: 2023-04-26

## 2020-06-04 RX ORDER — POTASSIUM CHLORIDE 20 MEQ/1
TABLET, EXTENDED RELEASE ORAL
COMMUNITY
End: 2020-06-04 | Stop reason: SDUPTHER
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
## 2020-06-04 NOTE — PATIENT INSTRUCTIONS
-Stay on Xarelto for at least 30 days after your procedure, ideally stay on this long term if it is affordable.    -Metoprolol tartrate 25 mg AM and PM. I will send a 30 day supply to [facility] but have sent a 90 day supply to [facility].

## 2020-06-04 NOTE — PROGRESS NOTES
"Subjective:   Chief Complaint:   Chief Complaint   Patient presents with   • Atrial Fibrillation     This office visit is taking place via video conference in the setting of the COVID-19 pandemic.    \"Maurice\" Douglas Landa Jr. is a 55 y.o. male who returns today for atrial fibrillation, hyperlipidemia, strong family history of coronary disease.      New A. fib, rate 136, Dec 2019.   Saw cardiology in [place] while visiting family.  Placed on spironolactone in addition to lasix.  Started on Xarelto in place of ASA.  [id number] is 1 or 2, had DM, has clinical CHF but echo was ok in rapid afib.  Metoprolol was stopped due to short of breath with walking and had to stop.    We has planned CRISTI/CV Dec 2019 but he was admitted to [facility] instead, HR was 150.    Has COPD, continues to smoke, more than 35 years, 1 PPD, interested in quitting.  Has hyperlipidemia, LDL slightly elevated at 103, HDL low at 29    Had Hep C, was treated successfully for this, no ETOH or rec drugs.  Does have some liver cirrhosis and hepatosplenomegaly on US.  Has DM, prior a1c was 14, much better.    No hyperlipidemia.  Has IFG.  Prior hypertension, no meds.  No history of autoimmune disease such as lupus or rheumatoid arthritis.  No chronic kidney disease.    He is not limited by chest pain, pressure or tightness with activity.   Very minimal dyspnea on exertion.   No orthopnea.  Does not notice palpitations.  No lightheadedness, or presyncope/syncope.    No stroke/TIA like symptoms.  Has chronic pitting LE edema, remembers seeing vascular, sounds like he had a vascular run off study.    Father had MI at 73, PGF  of MI in 70s  Sister  from something related to sepsis after fluid being drawn off of lungs, was 7 years ago.  Mother lives in [place], alive and well at 83, selling [employer].  Daughter lives in [facility], a  for [employer].    [place].  Was a [employer], Captured, " jeronimo.    DATA REVIEWED by me:  ECG 12/17/2019  Atrial fibrillation with RVR, rate 136    Echo 12-18-19  No prior study is available for comparison.   Fair quality study.  Left ventricular ejection fraction is visually estimated to be 55-70%,   with [id number] variability due to rapid atrial fibrillation.  Normal left atrial size.  Mild mitral annular calcification.  The aortic valve is not well visualized.  Trace tricuspid regurgitation.  Estimated right ventricular systolic pressure  is 25 mmHg.  Consider underfilled state such as dehydration or anemia.      Ab US 12-16-16  Hepatosplenomegaly..  Heterogeneous echotexture with nodular contour to liver. This can be seen with cirrhosis.    AB US 6-26-14  Splenomegaly.  The liver demonstrates a mildly scalloped contour, and slight heterogeneous echotexture, which both can be seen in the presence of cirrhotic disease.  Mild thickening of the gallbladder wall with no associated evidence of cholelithiasis or choledocholithiasis.    CT PE study 4-1-14  1.  Non-consolidated right lower lobe infiltrate. Patchy infiltrate left lung base. These findings are suspicious for infectious process.  2.  Exam limited by respiratory motion and streak artifact, no definite pulmonary embolism identified.  3.  Perhaps Mild changes of emphysema at the apices.    Cxray 5-8-18  No evidence of acute cardiopulmonary disease.    Most recent labs:     5-5-2020 , HDL 34, TSH 2.85, a1c 6,  Na 137, k 4.3, cr 0.59, lfts normal, h 14, pl 145    8/26/2017 sodium 135, potassium 4.1, creatinine 0.9, LFTs normal, , HDL 29, triglycerides 136, total cholesterol 148    UDS 7-2018 normal    Past Medical History:   Diagnosis Date   • Anxiety    • Congestive heart failure (HCC)    • Depression    • Diabetes    • Hep C w/o coma, chronic (HCC)    • Hyperlipidemia    • Insomnia    • Neuropathy of foot 6/10/2015   • Thyroid disorder      Past Surgical History:   Procedure Laterality Date   •  HERNIA REPAIR  2015    umbilical repair   • ENDOSCOPY PROCEDURE  6/10/2014    Performed by Frantz Hidalgo M.D. at SURGERY [facility] ORS   • ABDOMINAL EXPLORATION     • OPEN REDUCTION       Family History   Problem Relation Age of Onset   • Alcohol abuse Father    • Heart Disease Father          of MI at 73   • Other Sister          of sepsis, complications from thoracentesis   • Heart Disease Paternal Grandfather          of MI ~70s     Social History     Socioeconomic History   • Marital status: Single     Spouse name: Not on file   • Number of children: 4   • Years of education: 12   • Highest education level: Not on file   Occupational History     Employer: Disabled 2011   Social Needs   • Financial resource strain: Not on file   • Food insecurity     Worry: Not on file     Inability: Not on file   • Transportation needs     Medical: Not on file     Non-medical: Not on file   Tobacco Use   • Smoking status: Current Every Day Smoker     Packs/day: 1.00     Years: 40.00     Pack years: 40.00     Types: Cigarettes   • Smokeless tobacco: Never Used   • Tobacco comment: wants to quit.   Substance and Sexual Activity   • Alcohol use: No   • Drug use: No   • Sexual activity: Yes     Partners: Female     Birth control/protection: Condom   Lifestyle   • Physical activity     Days per week: Not on file     Minutes per session: Not on file   • Stress: Not on file   Relationships   • Social connections     Talks on phone: Not on file     Gets together: Not on file     Attends Yazidi service: Not on file     Active member of club or organization: Not on file     Attends meetings of clubs or organizations: Not on file     Relationship status: Not on file   • Intimate partner violence     Fear of current or ex partner: Not on file     Emotionally abused: Not on file     Physically abused: Not on file     Forced sexual activity: Not on file   Other Topics Concern   •  Service Yes     Comment: was in  "[employer]   • Blood Transfusions No   • Caffeine Concern Yes   • Occupational Exposure No   • Hobby Hazards No   • Sleep Concern No   • Stress Concern Yes   • Weight Concern Yes     Comment: overweight   • Special Diet Yes   • Back Care No   • Exercise Yes   • Bike Helmet Yes   • Seat Belt Yes   • Self-Exams No   Social History Narrative   • Not on file     Allergies   Allergen Reactions   • Vicodin [Hydrocodone-Acetaminophen]      itch       Current Outpatient Medications   Medication Sig Dispense Refill   • omeprazole (PRILOSEC OTC) 20 MG tablet orally     • rivaroxaban (XARELTO) 20 MG Tab tablet Take 1 Tab by mouth with dinner. 100 Tab 3   • metoprolol (LOPRESSOR) 25 MG Tab Take 1 Tab by mouth 2 times a day. 180 Tab 3   • spironolactone (ALDACTONE) 25 MG Tab Take 1 Tab by mouth every day. 90 Tab 3   • furosemide (LASIX) 40 MG Tab Take 1 Tab by mouth every day. 100 Tab 3   • potassium chloride SA (KDUR) 20 MEQ Tab CR Take 1 Tab by mouth every day. 90 Tab 3   • metoprolol (LOPRESSOR) 25 MG Tab Take 1 Tab by mouth 2 times a day. 60 Tab 0   • levothyroxine (SYNTHROID) 75 MCG Tab TAKE ONE TABLET BY MOUTH EVERY MORNING ON AN EMPTY STOMACH 30 Tab 0   • metformin (GLUCOPHAGE) 1000 MG tablet Take 1 Tab by mouth 2 times a day, with meals. 180 Tab 0     No current facility-administered medications for this visit.        ROS  All others systems reviewed and negative.     Objective:     Ht 1.905 m (6' 3\")   Wt (!) 131.5 kg (290 lb)  Body mass index is 36.25 kg/m².    This encounter was conducted via Zoom.  Verbal consent was obtained. Patient's identity was verified.    Vitals obtained by patient:    Physical Exam:  General: No acute distress. Well nourished.   HEENT: EOM grossly intact, no scleral icterus, no pharyngeal erythema.   Neck:  No JVD noted at 90 degrees, trachea midline  CVS: Pulse as reported by patient, fast, irregular no visible LE edema.  Resp: Unlabored respiratory effort, no cough or audible " wheeze  MSK/Ext: No clubbing or cyanosis visible appreciated.  Skin: wound Left lower shin  Neurological: CN III-XII grossly intact. No focal deficits.   Psych: A&O x 3, appropriate affect, good judgement, well groomed      Assessment:     1. Chronic atrial fibrillation (HCC)  EC-CRISTI W/O CONT    CL-CARDIOVERSION   2. Mixed hyperlipidemia     3. Tobacco abuse     4. Type 2 diabetes mellitus with hyperosmolarity without coma, with long-term current use of insulin (HCC)     5. Longstanding persistent atrial fibrillation (HCC)     6. Diastolic congestive heart failure, unspecified HF chronicity (HCC)     7. Chronic anticoagulation     8. Chronic venous insufficiency     9. Localized edema     10. Family history of coronary artery disease in father     11. RUTLEDGE (dyspnea on exertion)         Medical Decision Making:  Today's Assessment / Status / Plan:     -Rapid Afib, [id number] is 1 or 2 for DM, I think he is having diastolic dysfunction in the setting of rapid afib  -High risk med with lasix, spir, potassium, FU K 4.3. BMP every 6 months.  -has LE wound  -Needs metop now, eventual CRISTI CV, might need amiodarone, would definitely need blood thinner for CV but for now just  mg  -Needs LESLEY eval eventually  -We will talk about smoking cessation in the future  -Resume lower dose BB  -APN 2 weeks    Written instructions given today:    -Stay on Xarelto for at least 30 days after your procedure, ideally stay on this long term if it is affordable.    -Metoprolol tartrate 25 mg AM and PM. I will send a 30 day supply to [facility] but have sent a 90 day supply to [facility].    Return in about 2 weeks (around 6/18/2020).    It is my pleasure to participate in the care of Mr. Landa.  Please do not hesitate to contact me with questions or concerns.    Leann Bahena MD, [facility]  Cardiologist [facility]    Please note that this dictation was created using voice recognition software. I have made  every reasonable attempt to correct obvious errors, but it is possible there are errors of grammar and possibly content that I did not discover before finalizing the note.

## 2020-06-05 ENCOUNTER — TELEPHONE (OUTPATIENT)
Dept: CARDIOLOGY | Facility: MEDICAL CENTER | Age: 55
End: 2020-06-05

## 2020-06-05 NOTE — TELEPHONE ENCOUNTER
----- Message from Meghna Torres, Med Ass't sent at 6/5/2020  2:13 PM PDT -----  Regarding: FW: CRISTI/Cardioversion    ----- Message -----  From: Karen Cam, Med Ass't  Sent: 6/4/2020   1:28 PM PDT  To: Shayna Prakash, Med Ass't  Subject: CRISTI/Cardioversion                                KASSIDY Min ordered a cardioversion and CRISTI for this virtual pt.      Thank you!

## 2020-06-08 ENCOUNTER — TELEPHONE (OUTPATIENT)
Dept: MEDICAL GROUP | Facility: PHYSICIAN GROUP | Age: 55
End: 2020-06-08

## 2020-06-08 ENCOUNTER — PATIENT MESSAGE (OUTPATIENT)
Dept: MEDICAL GROUP | Facility: PHYSICIAN GROUP | Age: 55
End: 2020-06-08

## 2020-06-08 NOTE — TELEPHONE ENCOUNTER
VOICEMAIL  1. Caller Name: Douglas Landa Jr.                        Call Back Number: [phone] (home)      2. Message: Patient called and left a message stating he missed a call from us. As I look at his chart, it looks like the [facility] was calling him and left him a message to have him call them back. I have called him back left him Message to call them back or to call us back if he has any questions.     3. Patient approves office to leave a detailed voicemail/XOXO Kitchenhart message: yes

## 2020-06-08 NOTE — TELEPHONE ENCOUNTER
----- Message from STANFORD Newton sent at 6/6/2020 10:59 PM PDT -----  Regarding: FW: Pt having trouble getting through  Please contact pt and schedule a f/u with me after pt completes his labs. Thank you  ----- Message -----  From: Leann Bahena M.D.  Sent: 6/4/2020   1:22 PM PDT  To: STANFORD Newton  Subject: Pt having trouble getting through                Hi Roxane,  Pt reports having trouble getting through via phone, I encouraged him to use my chart. Maybe your staff could get him an appt. He's worried about his liver, I said I would pass on the message.  Tx as always,  Leann

## 2020-06-23 RX ORDER — AMMONIUM LACTATE 12 G/100G
LOTION TOPICAL
Qty: 1 BOTTLE | Refills: 2 | Status: SHIPPED | OUTPATIENT
Start: 2020-06-23

## 2020-06-25 ENCOUNTER — OFFICE VISIT (OUTPATIENT)
Dept: ADMISSIONS | Facility: MEDICAL CENTER | Age: 55
End: 2020-06-25
Attending: INTERNAL MEDICINE
Payer: MEDICARE

## 2020-06-25 DIAGNOSIS — Z01.810 PRE-OPERATIVE CARDIOVASCULAR EXAMINATION: ICD-10-CM

## 2020-06-25 DIAGNOSIS — Z01.812 PRE-OPERATIVE LABORATORY EXAMINATION: ICD-10-CM

## 2020-06-25 LAB
ALBUMIN SERPL BCP-MCNC: 3.9 G/DL (ref 3.2–4.9)
ALBUMIN/GLOB SERPL: 1.1 G/DL
ALP SERPL-CCNC: 84 U/L (ref 30–99)
ALT SERPL-CCNC: 25 U/L (ref 2–50)
ANION GAP SERPL CALC-SCNC: 10 MMOL/L (ref 7–16)
AST SERPL-CCNC: 15 U/L (ref 12–45)
BILIRUB SERPL-MCNC: 0.6 MG/DL (ref 0.1–1.5)
BUN SERPL-MCNC: 10 MG/DL (ref 8–22)
CALCIUM SERPL-MCNC: 9.2 MG/DL (ref 8.5–10.5)
CHLORIDE SERPL-SCNC: 101 MMOL/L (ref 96–112)
CO2 SERPL-SCNC: 24 MMOL/L (ref 20–33)
COVID ORDER STATUS COVID19: NORMAL
CREAT SERPL-MCNC: 0.68 MG/DL (ref 0.5–1.4)
EKG IMPRESSION: NORMAL
ERYTHROCYTE [DISTWIDTH] IN BLOOD BY AUTOMATED COUNT: 49.3 FL (ref 35.9–50)
GLOBULIN SER CALC-MCNC: 3.4 G/DL (ref 1.9–3.5)
GLUCOSE SERPL-MCNC: 90 MG/DL (ref 65–99)
HCT VFR BLD AUTO: 46 % (ref 42–52)
HGB BLD-MCNC: 14.7 G/DL (ref 14–18)
INR PPP: 1.07 (ref 0.87–1.13)
MCH RBC QN AUTO: 28.2 PG (ref 27–33)
MCHC RBC AUTO-ENTMCNC: 32 G/DL (ref 33.7–35.3)
MCV RBC AUTO: 88.3 FL (ref 81.4–97.8)
PLATELET # BLD AUTO: 152 K/UL (ref 164–446)
PMV BLD AUTO: 10.4 FL (ref 9–12.9)
POTASSIUM SERPL-SCNC: 4.2 MMOL/L (ref 3.6–5.5)
PROT SERPL-MCNC: 7.3 G/DL (ref 6–8.2)
PROTHROMBIN TIME: 14.2 SEC (ref 12–14.6)
RBC # BLD AUTO: 5.21 M/UL (ref 4.7–6.1)
SODIUM SERPL-SCNC: 135 MMOL/L (ref 135–145)
WBC # BLD AUTO: 5.8 K/UL (ref 4.8–10.8)

## 2020-06-25 PROCEDURE — 85610 PROTHROMBIN TIME: CPT

## 2020-06-25 PROCEDURE — C9803 HOPD COVID-19 SPEC COLLECT: HCPCS

## 2020-06-25 PROCEDURE — 93005 ELECTROCARDIOGRAM TRACING: CPT

## 2020-06-25 PROCEDURE — U0003 INFECTIOUS AGENT DETECTION BY NUCLEIC ACID (DNA OR RNA); SEVERE ACUTE RESPIRATORY SYNDROME CORONAVIRUS 2 (SARS-COV-2) (CORONAVIRUS DISEASE [COVID-19]), AMPLIFIED PROBE TECHNIQUE, MAKING USE OF HIGH THROUGHPUT TECHNOLOGIES AS DESCRIBED BY CMS-2020-01-R: HCPCS

## 2020-06-25 PROCEDURE — 85027 COMPLETE CBC AUTOMATED: CPT

## 2020-06-25 PROCEDURE — 80053 COMPREHEN METABOLIC PANEL: CPT

## 2020-06-25 PROCEDURE — 36415 COLL VENOUS BLD VENIPUNCTURE: CPT

## 2020-06-25 PROCEDURE — 93010 ELECTROCARDIOGRAM REPORT: CPT | Performed by: INTERNAL MEDICINE

## 2020-06-26 LAB
SARS-COV-2 RNA RESP QL NAA+PROBE: NOTDETECTED
SPECIMEN SOURCE: NORMAL

## 2020-06-29 PROBLEM — E66.9 OBESITY: Status: ACTIVE | Noted: 2020-06-29

## 2020-06-29 NOTE — OR NURSING
COVID-19 Pre-Surgery Screenin. Do you have an undiagnosed respiratory illness or symptoms such as coughing or sneezing? N     1. Onset of Sx: n/a     2. Acute vs. chronic respiratory illness: n/a     2. Do you have an unexplained fever greater than 100.4 degrees Fahrenheit or 38 degrees Celsius? N  ?  3. Have you had direct exposure to a patient who tested positive for Covid-19? N    4. Have you traveled outside of [place] within the last 14 days? N    5. Patient informed of current visitation and mask policies by this RN.  Patient also informed by this RN re: check in time of 6am, procedure length, and estimated discharge time of 11am.

## 2020-06-30 ENCOUNTER — APPOINTMENT (OUTPATIENT)
Dept: CARDIOLOGY | Facility: MEDICAL CENTER | Age: 55
End: 2020-06-30
Attending: INTERNAL MEDICINE
Payer: MEDICARE

## 2020-06-30 ENCOUNTER — ANESTHESIA EVENT (OUTPATIENT)
Dept: CARDIOLOGY | Facility: MEDICAL CENTER | Age: 55
End: 2020-06-30
Payer: MEDICARE

## 2020-06-30 ENCOUNTER — HOSPITAL ENCOUNTER (OUTPATIENT)
Facility: MEDICAL CENTER | Age: 55
End: 2020-06-30
Attending: INTERNAL MEDICINE | Admitting: INTERNAL MEDICINE
Payer: MEDICARE

## 2020-06-30 ENCOUNTER — ANESTHESIA (OUTPATIENT)
Dept: CARDIOLOGY | Facility: MEDICAL CENTER | Age: 55
End: 2020-06-30
Payer: MEDICARE

## 2020-06-30 VITALS
BODY MASS INDEX: 37.32 KG/M2 | DIASTOLIC BLOOD PRESSURE: 47 MMHG | RESPIRATION RATE: 18 BRPM | OXYGEN SATURATION: 97 % | HEART RATE: 76 BPM | SYSTOLIC BLOOD PRESSURE: 103 MMHG | WEIGHT: 306.44 LBS | TEMPERATURE: 97.5 F | HEIGHT: 76 IN

## 2020-06-30 DIAGNOSIS — I48.20 CHRONIC ATRIAL FIBRILLATION (HCC): ICD-10-CM

## 2020-06-30 DIAGNOSIS — I48.0 PAROXYSMAL ATRIAL FIBRILLATION (HCC): ICD-10-CM

## 2020-06-30 LAB
EKG IMPRESSION: NORMAL
EKG IMPRESSION: NORMAL
LV EJECT FRACT  99904: 55

## 2020-06-30 PROCEDURE — 93005 ELECTROCARDIOGRAM TRACING: CPT | Performed by: INTERNAL MEDICINE

## 2020-06-30 PROCEDURE — 92960 CARDIOVERSION ELECTRIC EXT: CPT | Performed by: INTERNAL MEDICINE

## 2020-06-30 PROCEDURE — 93312 ECHO TRANSESOPHAGEAL: CPT

## 2020-06-30 PROCEDURE — 93312 ECHO TRANSESOPHAGEAL: CPT | Mod: 26 | Performed by: INTERNAL MEDICINE

## 2020-06-30 PROCEDURE — 92960 CARDIOVERSION ELECTRIC EXT: CPT

## 2020-06-30 PROCEDURE — 700111 HCHG RX REV CODE 636 W/ 250 OVERRIDE (IP): Performed by: ANESTHESIOLOGY

## 2020-06-30 PROCEDURE — 700105 HCHG RX REV CODE 258: Performed by: ANESTHESIOLOGY

## 2020-06-30 PROCEDURE — 700101 HCHG RX REV CODE 250: Performed by: ANESTHESIOLOGY

## 2020-06-30 PROCEDURE — 93010 ELECTROCARDIOGRAM REPORT: CPT | Mod: 59,76,GZ | Performed by: INTERNAL MEDICINE

## 2020-06-30 PROCEDURE — 93010 ELECTROCARDIOGRAM REPORT: CPT | Mod: 59 | Performed by: INTERNAL MEDICINE

## 2020-06-30 PROCEDURE — 160002 HCHG RECOVERY MINUTES (STAT)

## 2020-06-30 RX ORDER — OXYCODONE HCL 5 MG/5 ML
10 SOLUTION, ORAL ORAL
Status: DISCONTINUED | OUTPATIENT
Start: 2020-06-30 | End: 2020-06-30 | Stop reason: HOSPADM

## 2020-06-30 RX ORDER — ONDANSETRON 2 MG/ML
4 INJECTION INTRAMUSCULAR; INTRAVENOUS
Status: DISCONTINUED | OUTPATIENT
Start: 2020-06-30 | End: 2020-06-30 | Stop reason: HOSPADM

## 2020-06-30 RX ORDER — HYDRALAZINE HYDROCHLORIDE 20 MG/ML
5 INJECTION INTRAMUSCULAR; INTRAVENOUS
Status: DISCONTINUED | OUTPATIENT
Start: 2020-06-30 | End: 2020-06-30 | Stop reason: HOSPADM

## 2020-06-30 RX ORDER — DIPHENHYDRAMINE HYDROCHLORIDE 50 MG/ML
12.5 INJECTION INTRAMUSCULAR; INTRAVENOUS
Status: DISCONTINUED | OUTPATIENT
Start: 2020-06-30 | End: 2020-06-30 | Stop reason: HOSPADM

## 2020-06-30 RX ORDER — HYDROMORPHONE HYDROCHLORIDE 1 MG/ML
0.1 INJECTION, SOLUTION INTRAMUSCULAR; INTRAVENOUS; SUBCUTANEOUS
Status: DISCONTINUED | OUTPATIENT
Start: 2020-06-30 | End: 2020-06-30 | Stop reason: HOSPADM

## 2020-06-30 RX ORDER — OXYCODONE HCL 5 MG/5 ML
5 SOLUTION, ORAL ORAL
Status: DISCONTINUED | OUTPATIENT
Start: 2020-06-30 | End: 2020-06-30 | Stop reason: HOSPADM

## 2020-06-30 RX ORDER — HALOPERIDOL 5 MG/ML
1 INJECTION INTRAMUSCULAR
Status: DISCONTINUED | OUTPATIENT
Start: 2020-06-30 | End: 2020-06-30 | Stop reason: HOSPADM

## 2020-06-30 RX ORDER — MEPERIDINE HYDROCHLORIDE 25 MG/ML
12.5 INJECTION INTRAMUSCULAR; INTRAVENOUS; SUBCUTANEOUS
Status: DISCONTINUED | OUTPATIENT
Start: 2020-06-30 | End: 2020-06-30 | Stop reason: HOSPADM

## 2020-06-30 RX ORDER — HYDROMORPHONE HYDROCHLORIDE 1 MG/ML
0.4 INJECTION, SOLUTION INTRAMUSCULAR; INTRAVENOUS; SUBCUTANEOUS
Status: DISCONTINUED | OUTPATIENT
Start: 2020-06-30 | End: 2020-06-30 | Stop reason: HOSPADM

## 2020-06-30 RX ORDER — LIDOCAINE HYDROCHLORIDE 20 MG/ML
INJECTION, SOLUTION EPIDURAL; INFILTRATION; INTRACAUDAL; PERINEURAL PRN
Status: DISCONTINUED | OUTPATIENT
Start: 2020-06-30 | End: 2020-06-30 | Stop reason: HOSPADM

## 2020-06-30 RX ORDER — SODIUM CHLORIDE, SODIUM LACTATE, POTASSIUM CHLORIDE, CALCIUM CHLORIDE 600; 310; 30; 20 MG/100ML; MG/100ML; MG/100ML; MG/100ML
INJECTION, SOLUTION INTRAVENOUS
Status: DISCONTINUED | OUTPATIENT
Start: 2020-06-30 | End: 2020-06-30 | Stop reason: SURG

## 2020-06-30 RX ORDER — HYDROMORPHONE HYDROCHLORIDE 1 MG/ML
0.2 INJECTION, SOLUTION INTRAMUSCULAR; INTRAVENOUS; SUBCUTANEOUS
Status: DISCONTINUED | OUTPATIENT
Start: 2020-06-30 | End: 2020-06-30 | Stop reason: HOSPADM

## 2020-06-30 RX ORDER — LABETALOL HYDROCHLORIDE 5 MG/ML
5 INJECTION, SOLUTION INTRAVENOUS
Status: DISCONTINUED | OUTPATIENT
Start: 2020-06-30 | End: 2020-06-30 | Stop reason: HOSPADM

## 2020-06-30 RX ORDER — SODIUM CHLORIDE, SODIUM LACTATE, POTASSIUM CHLORIDE, CALCIUM CHLORIDE 600; 310; 30; 20 MG/100ML; MG/100ML; MG/100ML; MG/100ML
INJECTION, SOLUTION INTRAVENOUS CONTINUOUS
Status: DISCONTINUED | OUTPATIENT
Start: 2020-06-30 | End: 2020-06-30 | Stop reason: HOSPADM

## 2020-06-30 RX ADMIN — PROPOFOL 20 MG: 10 INJECTION, EMULSION INTRAVENOUS at 08:55

## 2020-06-30 RX ADMIN — PROPOFOL 20 MG: 10 INJECTION, EMULSION INTRAVENOUS at 08:44

## 2020-06-30 RX ADMIN — PROPOFOL 20 MG: 10 INJECTION, EMULSION INTRAVENOUS at 08:58

## 2020-06-30 RX ADMIN — PROPOFOL 20 MG: 10 INJECTION, EMULSION INTRAVENOUS at 08:56

## 2020-06-30 RX ADMIN — PROPOFOL 20 MG: 10 INJECTION, EMULSION INTRAVENOUS at 08:40

## 2020-06-30 RX ADMIN — PROPOFOL 20 MG: 10 INJECTION, EMULSION INTRAVENOUS at 09:02

## 2020-06-30 RX ADMIN — PROPOFOL 20 MG: 10 INJECTION, EMULSION INTRAVENOUS at 08:46

## 2020-06-30 RX ADMIN — PROPOFOL 20 MG: 10 INJECTION, EMULSION INTRAVENOUS at 08:48

## 2020-06-30 RX ADMIN — PROPOFOL 20 MG: 10 INJECTION, EMULSION INTRAVENOUS at 09:05

## 2020-06-30 RX ADMIN — PROPOFOL 20 MG: 10 INJECTION, EMULSION INTRAVENOUS at 08:54

## 2020-06-30 RX ADMIN — PROPOFOL 100 MG: 10 INJECTION, EMULSION INTRAVENOUS at 08:38

## 2020-06-30 RX ADMIN — PROPOFOL 20 MG: 10 INJECTION, EMULSION INTRAVENOUS at 09:06

## 2020-06-30 RX ADMIN — SODIUM CHLORIDE, POTASSIUM CHLORIDE, SODIUM LACTATE AND CALCIUM CHLORIDE: 600; 310; 30; 20 INJECTION, SOLUTION INTRAVENOUS at 09:12

## 2020-06-30 RX ADMIN — LIDOCAINE HYDROCHLORIDE 80 MG: 20 INJECTION, SOLUTION EPIDURAL; INFILTRATION; INTRACAUDAL at 08:38

## 2020-06-30 RX ADMIN — PROPOFOL 20 MG: 10 INJECTION, EMULSION INTRAVENOUS at 09:04

## 2020-06-30 RX ADMIN — PROPOFOL 20 MG: 10 INJECTION, EMULSION INTRAVENOUS at 08:42

## 2020-06-30 RX ADMIN — PROPOFOL 20 MG: 10 INJECTION, EMULSION INTRAVENOUS at 08:52

## 2020-06-30 RX ADMIN — PROPOFOL 20 MG: 10 INJECTION, EMULSION INTRAVENOUS at 09:00

## 2020-06-30 RX ADMIN — PROPOFOL 20 MG: 10 INJECTION, EMULSION INTRAVENOUS at 08:50

## 2020-06-30 ASSESSMENT — PAIN SCALES - GENERAL: PAIN_LEVEL: 0

## 2020-06-30 ASSESSMENT — FIBROSIS 4 INDEX: FIB4 SCORE: 1.125

## 2020-06-30 NOTE — ANESTHESIA PREPROCEDURE EVALUATION
Relevant Problems   NEURO   (+) H/O chronic active hepatitis      CARDIAC   (+) CHF (congestive heart failure) (HCC)   (+) Chronic atrial fibrillation (HCC)      ENDO   (+) Hypothyroidism   (+) Type 2 diabetes mellitus with hyperosmolarity without coma, with long-term current use of insulin (HCC)      Other   (+) Anxiety associated with depression   (+) BMI 39.0-39.9,adult   (+) Mixed hyperlipidemia   (+) Obesity   (+) Tobacco abuse       Physical Exam    Airway   Mallampati: III  TM distance: >3 FB  Neck ROM: full       Cardiovascular - normal exam  Rhythm: regular  Rate: normal  (-) murmur     Dental - normal exam  (+) upper dentures, lower dentures           Pulmonary - normal exam  Breath sounds clear to auscultation     Abdominal    Neurological - normal exam                 Anesthesia Plan    ASA 3   ASA physical status 3 criteria: COPD    Plan - MAC             Induction: intravenous    Postoperative Plan: Postoperative administration of opioids is intended.    Pertinent diagnostic labs and testing reviewed    Informed Consent:    Anesthetic plan and risks discussed with patient.    Use of blood products discussed with: patient whom consented to blood products.

## 2020-06-30 NOTE — ANESTHESIA POSTPROCEDURE EVALUATION
Patient: Douglas Landa Jr.    Procedure Summary     Date:  06/30/20 Room / Location:  [facility] ECHOCARDIOLOGY [facility]    Anesthesia Start:  0811 Anesthesia Stop:  0923    Procedures:       CL-CARDIOVERSION      EC-CRISTI W/O CONT Diagnosis:       Chronic atrial fibrillation (HCC)      Chronic atrial fibrillation, unspecified            (See Associated Dx)      (difficulty with sedation, ask pt.)    Scheduled Providers:  Leann Bahena M.D.; Dilma Cartagena M.D. Responsible Provider:  Dilma Cartagena M.D.    Anesthesia Type:  MAC ASA Status:  3          Final Anesthesia Type: MAC  Last vitals  BP   Blood Pressure: 107/71    Temp   35.8 °C (96.5 °F)    Pulse   Pulse: (!) 102   Resp   20    SpO2   95 %      Anesthesia Post Evaluation    Patient location during evaluation: PACU  Patient participation: complete - patient participated  Level of consciousness: awake and alert  Pain score: 0    Airway patency: patent  Anesthetic complications: no  Cardiovascular status: hemodynamically stable  Respiratory status: acceptable  Hydration status: euvolemic    PONV: none           Nurse Pain Score: 0 (NPRS)

## 2020-06-30 NOTE — ANESTHESIA PROCEDURE NOTES
Peripheral IV    Date/Time: 6/30/2020 8:30 AM  Performed by: Dilma Cartagena M.D.  Authorized by: Dilma Cartagena M.D.     Size:  18 G  Laterality:  Right  Site Prep:  Alcohol  Technique:  Ultrasound guided  Attempts:  2

## 2020-06-30 NOTE — OR NURSING
0920 Patient arrived from cath lab s/p cardioversion. Patient awake, denies pain, denies nausea. Monitor reveals SB 50s. Vss  1000 Patient tolerating oral fluids.   1021 discharge instructions given to patient, patient verbalize understanding of the orders, reviewed activity, worsening symptoms, follow up, medications, dressing care.   1026 Criteria met to discharge patient out of recovery  1030 patient escorted via w/c with all his personal belongings.

## 2020-06-30 NOTE — DISCHARGE INSTRUCTIONS
ACTIVITY: Rest and take it easy for the first 24 hours.  A responsible adult is recommended to remain with you during that time.  It is normal to feel sleepy.  We encourage you to not do anything that requires balance, judgment or coordination.    MILD FLU-LIKE SYMPTOMS ARE NORMAL. YOU MAY EXPERIENCE GENERALIZED MUSCLE ACHES, THROAT IRRITATION, HEADACHE AND/OR SOME NAUSEA.    FOR 24 HOURS DO NOT:  Drive, operate machinery or run household appliances.  Drink beer or alcoholic beverages.   Make important decisions or sign legal documents.    SPECIAL INSTRUCTIONS:     Transesophageal Echocardiogram    Transesophageal echocardiogram (CRISTI) is a test that uses sound waves (echocardiogram) to produce very clear, detailed images of the heart. CRISTI is done by passing a flexible tube down the esophagus. The heart is located in front of the esophagus.  CRISTI may be done:  · To check how well your heart valves are working.  · To check for any abnormal growth or tumor  · To look for blood clots  · To check for infection of the lining of the heart (endocarditis).  · To evaluate the dividing wall (septum) of the heart and check for a hole that did not close after birth (patent foramen ovale or atrial septal defect).  · To help diagnose a tear in the wall of the blood vessels (aortic dissection).  · To look at the heart shape, size, and function. Any changes can be associated with certain conditions, including heart failure, aneurysm, and coronary heart disease, CAD.  · During cardiac valve surgery. This allows the surgeon to assess the valve repair before closing the chest.  · During a variety of other cardiac procedures to guide positioning of catheters.  · To monitor your heart's response to IV fluids or medicine.  CRISTI is usually not a painful procedure. You may feel the probe press against the back of the throat. The probe does not enter the trachea and does not affect your breathing.  What are the risks?  Generally, this is a  safe procedure. However, problems may occur, including:  · Damage to other structures or organs.  · A tear of the esophagus (esophageal rupture).  · Irregular heart beat (arrhythmia).  · Hoarse voice or difficulty swallowing.  · Bleeding (hemorrhage).  General instructions  · You will need to remove any dentures or dental retainers.  · Plan to have someone take you home from the hospital or clinic.  · Plan to have a responsible adult care for you for at least 24 hours after you leave the hospital or clinic. This is important.  · Ask your health care provider about:  ? Changing or stopping your normal medicines. This is important if you take diabetes medicines or blood thinners.  ? Taking over-the-counter medicines, vitamins, herbs, and supplements.  ? Taking medicines such as aspirin and ibuprofen. These medicines can thin your blood. Do not take these medicines unless your health care provider tells you to take them.  What happens during the procedure?  · To reduce your risk of infection:  ? Your health care team will wash or sanitize their hands.  ? Hair may be removed from the surgical area.  ? Your skin will be washed with soap.  · An IV will be inserted into one of your veins.  · You will be given one or both of the following:  ? A medicine to help you relax (sedative).  ? A medicine to be sprayed or gargled in order to numb the back of your throat (local anesthetic).  · Your blood pressure, heart rate, and breathing (vital signs) will be monitored during the procedure.  · You may be asked to lay on your left side.  · A bite block will be placed in your mouth to keep you from biting the tube during the procedure.  · The tip of the CRISTI probe will be placed into the back of your mouth. You will be asked to swallow. This helps to pass the tip of the probe into the esophagus.  · Once the tip of the probe is in the correct area, your health care provider will take pictures of the heart.  · The probe and bite block  will be removed when the procedure is done.  The procedure may vary among health care providers and hospitals  What happens after the procedure?  · Your blood pressure, heart rate, breathing rate, and blood oxygen level will be monitored until the medicines you were given have worn off.  · When you first wake up, your throat may feel slightly sore and will probably still feel numb. This will improve slowly over time. You will not be allowed to eat or drink until the numbness has gone away.  · Do not drive for 24 hours if you received a sedative.  Summary  · Transesophageal echocardiogram (CRISTI) is a test that uses sound waves (echocardiogram) to produce very clear, detailed images of the heart.  · CRISTI is done by passing a flexible tube down the esophagus.  · Generally, this is a safe procedure. However, problems may occur, including damage to other structures or organs, bleeding, irregular heart beat, and a hoarse voice or trouble swallowing.  · Tell your health care provider about any medicines and medical conditions you may have, as some conditions may increase your risk of complications.  This information is not intended to replace advice given to you by your health care provider. Make sure you discuss any questions you have with your health care provider.  Document Released: 03/09/2004 Document Revised: 05/29/2018 Document Reviewed: 03/16/2018  Elsevier Patient Education © 2020 [employer]    CRISTI HOME CARE INSTRUCTIONS    CRISTI - TRANSESOPHAGEAL ECHOCARDIOGRAM  1. Don't drive or drink alcohol for 24 hours. The medication you received will make you too drowsy.  2. If you begin to vomit bloody material, or develop black or bloody stools, call your doctor as soon as possible.  3. If you have any neck, chest, abdominal pain or temp of 100 degrees, call your doctor.    You should call 911 if you develop problems with breathing or chest pain.  If any questions arise, call your doctor. If your doctor is not available,  please feel free to call. You can also call the HEALTH HOTLINE open 24 hours/day, 7 days/week and speak to a nurse at [phone], or toll free [phone].    Electrical Cardioversion, Care After  This sheet gives you information about how to care for yourself after your procedure. Your health care provider may also give you more specific instructions. If you have problems or questions, contact your health care provider.  What can I expect after the procedure?  After the procedure, it is common to have:  · Some redness on the skin where the shocks were given.  Follow these instructions at home:    · Do not drive for 24 hours if you were given a medicine to help you relax (sedative).  · Take over-the-counter and prescription medicines only as told by your health care provider.  · Ask your health care provider how to check your pulse. Check it often.  · Rest for 48 hours after the procedure or as told by your health care provider.  · Avoid or limit your caffeine use as told by your health care provider.  Contact a health care provider if:  · You feel like your heart is beating too quickly or your pulse is not regular.  · You have a serious muscle cramp that does not go away.  Get help right away if:    · You have discomfort in your chest.  · You are dizzy or you feel faint.  · You have trouble breathing or you are short of breath.  · Your speech is slurred.  · You have trouble moving an arm or leg on one side of your body.  · Your fingers or toes turn cold or blue.  This information is not intended to replace advice given to you by your health care provider. Make sure you discuss any questions you have with your health care provider.  Document Released: 10/08/2014 Document Revised: 11/30/2018 Document Reviewed: 06/23/2017  Elsevier Patient Education © 2020 [employer]      DIET: To avoid nausea, slowly advance diet as tolerated, avoiding spicy or greasy foods for the first day.  Add more substantial food to  your diet according to your physician's instructions. INCREASE FLUIDS AND FIBER TO AVOID CONSTIPATION.    FOLLOW-UP APPOINTMENT:  A follow-up appointment should be arranged with your cardiologist; call to schedule.    You should CALL YOUR PHYSICIAN if you develop:  Fever greater than 101 degrees F.  Pain not relieved by medication, or persistent nausea or vomiting.  Excessive bleeding (blood soaking through dressing) or unexpected drainage from the wound.  Extreme redness or swelling around the incision site, drainage of pus or foul smelling drainage.  Inability to urinate or empty your bladder within 8 hours.  Problems with breathing or chest pain.    You should call 911 if you develop problems with breathing or chest pain.  If you are unable to contact your doctor or surgical center, you should go to the nearest emergency room or urgent care center.  Physician's telephone #: [phone]    If any questions arise, call your doctor.  If your doctor is not available, please feel free to call the Surgical Center at [phone].  The Center is open Monday through Friday from 7AM to 7PM.  You can also call the [employer] HOTLINE open 24 hours/day, 7 days/week and speak to a nurse at [phone], or toll free at [phone].    A registered nurse may call you a few days after your surgery to see how you are doing after your procedure.    MEDICATIONS: Resume taking daily medication.  Take prescribed pain medication with food.  If no medication is prescribed, you may take non-aspirin pain medication if needed.  PAIN MEDICATION CAN BE VERY CONSTIPATING.  Take a stool softener or laxative such as senokot, pericolace, or milk of magnesia if needed.    If your physician has prescribed pain medication that includes Acetaminophen (Tylenol), do not take additional Acetaminophen (Tylenol) while taking the prescribed medication.    Depression / Suicide Risk    As you are discharged from this [facility], it is important  to learn how to keep safe from harming yourself.    Recognize the warning signs:  · Abrupt changes in personality, positive or negative- including increase in energy   · Giving away possessions  · Change in eating patterns- significant weight changes-  positive or negative  · Change in sleeping patterns- unable to sleep or sleeping all the time   · Unwillingness or inability to communicate  · Depression  · Unusual sadness, discouragement and loneliness  · Talk of wanting to die  · Neglect of personal appearance   · Rebelliousness- reckless behavior  · Withdrawal from people/activities they love  · Confusion- inability to concentrate     If you or a loved one observes any of these behaviors or has concerns about self-harm, here's what you can do:  · Talk about it- your feelings and reasons for harming yourself  · Remove any means that you might use to hurt yourself (examples: pills, rope, extension cords, firearm)  · Get professional help from the community (Mental Health, Substance Abuse, psychological counseling)  · Do not be alone:Call your Safe Contact- someone whom you trust who will be there for you.  · Call your local CRISIS HOTLINE 827-8784 or 123-154-9814  · Call your local Children's Mobile Crisis Response Team [place] (243) 500-7533 or www.[employer]  · Call the toll free National Suicide Prevention Hotlines   · National Suicide Prevention Lifeline 191-505-HKKB (0119)  · National Hope Line Network 800-SUICIDE (914-8044)

## 2020-06-30 NOTE — ANESTHESIA QCDR
2019 [facility] Clinical Data Registry (for Quality Improvement)     Postoperative nausea/vomiting risk protocol (Adult = 18 yrs and Pediatric 3-17 yrs)- (430 and 463)  General inhalation anesthetic (NOT TIVA) with PONV risk factors: No  Provision of anti-emetic therapy with at least 2 different classes of agents: N/A  Patient DID NOT receive anti-emetic therapy and reason is documented in Medical Record: N/A    Multimodal Pain Management- (477)  Non-emergent surgery AND patient age >= 18: No  Use of Multimodal Pain Management, two or more drugs and/or interventions, NOT including systemic opioids:   Exception: Documented allergy to multiple classes of analgesics:     Smoking Abstinence (404)  Patient is current smoker (cigarette, pipe, e-cig, marijuanna): No  Elective Surgery:   Abstinence instructions provided prior to day of surgery:   Patient abstained from smoking on day of surgery:     Pre-Op Beta-Blocker in Isolated CABG (44)  Isolated CABG AND patient age >= 18: No  Beta-blocker admin within 24 hours of surgical incision:   Exception:of medical reason(s) for not administering beta blocker within 24 hours prior to surgical incision (e.g., not  indicated,other medical reason):     PACU assessment of acute postoperative pain prior to Anesthesia Care End- Applies to Patients Age = 18- (ABG7)  Initial PACU pain score is which of the following: < 7/10  Patient unable to report pain score: N/A    Post-anesthetic transfer of care checklist/protocol to PACU/ICU- (426 and 427)  Upon conclusion of case, patient transferred to which of the following locations: PACU/Non-ICU  Use of transfer checklist/protocol: Yes  Exclusion: Service Performed in Patient Hospital Room (and thus did not require transfer): N/A  Unplanned admission to ICU related to anesthesia service up through end of PACU care- (MD51)  Unplanned admission to ICU (not initially anticipated at anesthesia start time): No

## 2020-06-30 NOTE — ANESTHESIA TIME REPORT
Anesthesia Start and Stop Event Times     Date Time Event    6/30/2020 0805 Ready for Procedure     0811 Anesthesia Start     0923 Anesthesia Stop        Responsible Staff  06/30/20    Name Role Begin End    Dilma Cartagena M.D. Anesth 0811 0923        Preop Diagnosis (Free Text):  Pre-op Diagnosis             Preop Diagnosis (Codes):    Post op Diagnosis  Atrial fibrillation (HCC)      Premium Reason  Non-Premium    Comments:

## 2020-07-07 NOTE — PROCEDURES
Electrical Cardioversion    Date/Time: 7/7/2020 4:18 PM  Performed by: Leann Bahena M.D.  Authorized by: Leann Bahena M.D.     Consent:     Consent obtained:  Written    Consent given by:  Patient    Risks discussed:  Induced arrhythmia, cutaneous burn, death and pain    Alternatives discussed:  Rate-control medication  Sedation:     Patient sedated: Yes      Sedation type:  Per anesthesia  Pre-procedure details:     Cardioversion basis:  Elective    Rhythm:  Atrial fibrillation    Anticoagulation status:  Rivaroxaban  Attempt one:     Cardioversion mode:  Synchronous    Waveform:  Biphasic    Shock (Joules):  200    Shock outcome:  Conversion to normal sinus rhythm  Post-procedure details:     Patient status:  Awake    Patient tolerance of procedure:  Tolerated well, no immediate complications        CRISTI done prior.

## 2020-08-04 RX ORDER — LEVOTHYROXINE SODIUM 0.07 MG/1
TABLET ORAL
Qty: 30 TAB | Refills: 0 | Status: SHIPPED | OUTPATIENT
Start: 2020-08-04 | End: 2020-10-07

## 2020-10-23 DIAGNOSIS — Z79.4 TYPE 2 DIABETES MELLITUS WITH HYPEROSMOLARITY WITHOUT COMA, WITH LONG-TERM CURRENT USE OF INSULIN (HCC): ICD-10-CM

## 2020-10-23 DIAGNOSIS — E11.00 TYPE 2 DIABETES MELLITUS WITH HYPEROSMOLARITY WITHOUT COMA, WITH LONG-TERM CURRENT USE OF INSULIN (HCC): ICD-10-CM

## 2020-10-27 RX ORDER — INSULIN DEGLUDEC 200 U/ML
INJECTION, SOLUTION SUBCUTANEOUS
Qty: 1 ML | Refills: 0 | Status: SHIPPED | OUTPATIENT
Start: 2020-10-27 | End: 2020-12-11

## 2020-10-27 RX ORDER — DULAGLUTIDE 0.75 MG/.5ML
INJECTION, SOLUTION SUBCUTANEOUS
Qty: 1 ML | Refills: 0 | Status: SHIPPED | OUTPATIENT
Start: 2020-10-27 | End: 2023-04-26

## 2020-10-28 NOTE — TELEPHONE ENCOUNTER
Phone Number Called: [phone] (home)       Call outcome: Spoke to patient regarding message below.    Message: I have spoken with patient and he is aware that he, he has to have an appointment with Roxane before she will refill his medication again. He stated he will call back and make an appointment within the next month.      Pt needs appt. Pt needs to be seen in the office at least every 6 mos. If not willing to come in, I oscar recommend establishing care with a new provider based on preference and convenience. Thank you

## 2020-12-11 DIAGNOSIS — E11.00 TYPE 2 DIABETES MELLITUS WITH HYPEROSMOLARITY WITHOUT COMA, WITH LONG-TERM CURRENT USE OF INSULIN (HCC): ICD-10-CM

## 2020-12-11 DIAGNOSIS — Z79.4 TYPE 2 DIABETES MELLITUS WITH HYPEROSMOLARITY WITHOUT COMA, WITH LONG-TERM CURRENT USE OF INSULIN (HCC): ICD-10-CM

## 2020-12-11 RX ORDER — INSULIN DEGLUDEC 200 U/ML
INJECTION, SOLUTION SUBCUTANEOUS
Qty: 27 ML | Refills: 0 | Status: SHIPPED | OUTPATIENT
Start: 2020-12-11 | End: 2023-04-26

## 2021-10-15 PROBLEM — E11.628 DIABETIC FOOT INFECTION (HCC): Status: ACTIVE | Noted: 2021-09-25

## 2021-10-15 PROBLEM — I10 ESSENTIAL HYPERTENSION: Status: ACTIVE | Noted: 2020-11-10

## 2021-10-15 PROBLEM — R61 HYPERHIDROSIS: Status: ACTIVE | Noted: 2020-11-10

## 2021-10-15 PROBLEM — F41.1 GENERALIZED ANXIETY DISORDER: Status: ACTIVE | Noted: 2017-03-28

## 2021-10-15 PROBLEM — R74.01 TRANSAMINITIS: Status: ACTIVE | Noted: 2021-09-25

## 2021-10-15 PROBLEM — I70.213 ATHEROSCLEROSIS OF NATIVE ARTERY OF BOTH LOWER EXTREMITIES WITH INTERMITTENT CLAUDICATION (HCC): Status: ACTIVE | Noted: 2020-11-10

## 2021-10-15 PROBLEM — L97.512 NON-PRESSURE CHRONIC ULCER OF OTHER PART OF RIGHT FOOT WITH FAT LAYER EXPOSED (HCC): Status: ACTIVE | Noted: 2020-12-30

## 2021-10-15 PROBLEM — E66.3 OVERWEIGHT WITH BODY MASS INDEX (BMI) OF 29 TO 29.9 IN ADULT: Status: ACTIVE | Noted: 2020-11-10

## 2021-10-15 PROBLEM — N52.9 ERECTILE DYSFUNCTION: Status: ACTIVE | Noted: 2020-11-10

## 2021-10-15 PROBLEM — K74.60 CIRRHOSIS OF LIVER (HCC): Status: ACTIVE | Noted: 2020-11-10

## 2021-10-15 PROBLEM — L08.9 DIABETIC FOOT INFECTION (HCC): Status: ACTIVE | Noted: 2021-09-25

## 2021-10-15 PROBLEM — M62.469: Status: ACTIVE | Noted: 2021-10-15

## 2021-10-15 PROBLEM — E11.21 TYPE 2 DIABETES MELLITUS WITH DIABETIC NEPHROPATHY (HCC): Status: ACTIVE | Noted: 2017-07-24

## 2021-10-15 PROBLEM — Z89.411: Status: ACTIVE | Noted: 2021-10-15

## 2021-10-15 PROBLEM — M86.9 OSTEOMYELITIS OF RIGHT FOOT (HCC): Status: ACTIVE | Noted: 2021-09-05

## 2023-02-14 ENCOUNTER — APPOINTMENT (OUTPATIENT)
Dept: MEDICAL GROUP | Facility: CLINIC | Age: 58
End: 2023-02-14
Payer: MEDICARE

## 2023-02-21 ENCOUNTER — NON-PROVIDER VISIT (OUTPATIENT)
Dept: MEDICAL GROUP | Facility: CLINIC | Age: 58
End: 2023-02-21
Payer: MEDICARE

## 2023-02-21 ENCOUNTER — HOSPITAL ENCOUNTER (OUTPATIENT)
Facility: MEDICAL CENTER | Age: 58
End: 2023-02-21
Attending: INTERNAL MEDICINE
Payer: MEDICARE

## 2023-02-21 PROCEDURE — 80061 LIPID PANEL: CPT

## 2023-02-21 PROCEDURE — 82306 VITAMIN D 25 HYDROXY: CPT

## 2023-02-21 PROCEDURE — 84439 ASSAY OF FREE THYROXINE: CPT

## 2023-02-21 PROCEDURE — 84443 ASSAY THYROID STIM HORMONE: CPT

## 2023-02-21 PROCEDURE — 83735 ASSAY OF MAGNESIUM: CPT

## 2023-02-21 PROCEDURE — 83036 HEMOGLOBIN GLYCOSYLATED A1C: CPT | Mod: GA

## 2023-02-21 PROCEDURE — 85025 COMPLETE CBC W/AUTO DIFF WBC: CPT

## 2023-02-21 PROCEDURE — 80053 COMPREHEN METABOLIC PANEL: CPT

## 2023-02-21 PROCEDURE — 82746 ASSAY OF FOLIC ACID SERUM: CPT

## 2023-02-21 PROCEDURE — 82607 VITAMIN B-12: CPT

## 2023-02-21 PROCEDURE — 36415 COLL VENOUS BLD VENIPUNCTURE: CPT | Performed by: PHYSICIAN ASSISTANT

## 2023-02-22 LAB
ALBUMIN SERPL BCP-MCNC: 4.3 G/DL (ref 3.2–4.9)
ALBUMIN/GLOB SERPL: 1.5 G/DL
ALP SERPL-CCNC: 93 U/L (ref 30–99)
ALT SERPL-CCNC: 34 U/L (ref 2–50)
ANION GAP SERPL CALC-SCNC: 10 MMOL/L (ref 7–16)
AST SERPL-CCNC: 20 U/L (ref 12–45)
BASOPHILS # BLD AUTO: 0.7 % (ref 0–1.8)
BASOPHILS # BLD: 0.04 K/UL (ref 0–0.12)
BILIRUB SERPL-MCNC: 0.6 MG/DL (ref 0.1–1.5)
BUN SERPL-MCNC: 13 MG/DL (ref 8–22)
CALCIUM ALBUM COR SERPL-MCNC: 9.2 MG/DL (ref 8.5–10.5)
CALCIUM SERPL-MCNC: 9.4 MG/DL (ref 8.5–10.5)
CHLORIDE SERPL-SCNC: 103 MMOL/L (ref 96–112)
CHOLEST SERPL-MCNC: 199 MG/DL (ref 100–199)
CO2 SERPL-SCNC: 26 MMOL/L (ref 20–33)
CREAT SERPL-MCNC: 1.01 MG/DL (ref 0.5–1.4)
EOSINOPHIL # BLD AUTO: 0.15 K/UL (ref 0–0.51)
EOSINOPHIL NFR BLD: 2.6 % (ref 0–6.9)
ERYTHROCYTE [DISTWIDTH] IN BLOOD BY AUTOMATED COUNT: 50.6 FL (ref 35.9–50)
EST. AVERAGE GLUCOSE BLD GHB EST-MCNC: 137 MG/DL
FOLATE SERPL-MCNC: 10.4 NG/ML
GFR SERPLBLD CREATININE-BSD FMLA CKD-EPI: 85 ML/MIN/1.73 M 2
GLOBULIN SER CALC-MCNC: 2.9 G/DL (ref 1.9–3.5)
GLUCOSE SERPL-MCNC: 135 MG/DL (ref 65–99)
HBA1C MFR BLD: 6.4 % (ref 4–5.6)
HCT VFR BLD AUTO: 51.3 % (ref 42–52)
HDLC SERPL-MCNC: 29 MG/DL
HGB BLD-MCNC: 16.9 G/DL (ref 14–18)
IMM GRANULOCYTES # BLD AUTO: 0.03 K/UL (ref 0–0.11)
IMM GRANULOCYTES NFR BLD AUTO: 0.5 % (ref 0–0.9)
LDLC SERPL CALC-MCNC: 138 MG/DL
LYMPHOCYTES # BLD AUTO: 1.4 K/UL (ref 1–4.8)
LYMPHOCYTES NFR BLD: 24.3 % (ref 22–41)
MCH RBC QN AUTO: 30.5 PG (ref 27–33)
MCHC RBC AUTO-ENTMCNC: 32.9 G/DL (ref 33.7–35.3)
MCV RBC AUTO: 92.4 FL (ref 81.4–97.8)
MONOCYTES # BLD AUTO: 0.59 K/UL (ref 0–0.85)
MONOCYTES NFR BLD AUTO: 10.2 % (ref 0–13.4)
NEUTROPHILS # BLD AUTO: 3.56 K/UL (ref 1.82–7.42)
NEUTROPHILS NFR BLD: 61.7 % (ref 44–72)
NRBC # BLD AUTO: 0 K/UL
NRBC BLD-RTO: 0 /100 WBC
PLATELET # BLD AUTO: 161 K/UL (ref 164–446)
PMV BLD AUTO: 11 FL (ref 9–12.9)
POTASSIUM SERPL-SCNC: 5.1 MMOL/L (ref 3.6–5.5)
PROT SERPL-MCNC: 7.2 G/DL (ref 6–8.2)
RBC # BLD AUTO: 5.55 M/UL (ref 4.7–6.1)
SODIUM SERPL-SCNC: 139 MMOL/L (ref 135–145)
T4 FREE SERPL-MCNC: 1.1 NG/DL (ref 0.93–1.7)
TRIGL SERPL-MCNC: 158 MG/DL (ref 0–149)
TSH SERPL DL<=0.005 MIU/L-ACNC: 1.39 UIU/ML (ref 0.38–5.33)
VIT B12 SERPL-MCNC: 533 PG/ML (ref 211–911)
WBC # BLD AUTO: 5.8 K/UL (ref 4.8–10.8)

## 2023-02-23 LAB
25(OH)D3 SERPL-MCNC: 15 NG/ML (ref 30–100)
MAGNESIUM SERPL-MCNC: 2.2 MG/DL (ref 1.5–2.5)

## 2023-04-18 ENCOUNTER — APPOINTMENT (OUTPATIENT)
Dept: ADMISSIONS | Facility: MEDICAL CENTER | Age: 58
End: 2023-04-18
Attending: UROLOGY
Payer: COMMERCIAL

## 2023-04-21 ENCOUNTER — APPOINTMENT (OUTPATIENT)
Dept: ADMISSIONS | Facility: MEDICAL CENTER | Age: 58
End: 2023-04-21
Attending: UROLOGY
Payer: COMMERCIAL

## 2023-04-26 ENCOUNTER — PRE-ADMISSION TESTING (OUTPATIENT)
Dept: ADMISSIONS | Facility: MEDICAL CENTER | Age: 58
End: 2023-04-26
Attending: UROLOGY
Payer: COMMERCIAL

## 2023-04-26 RX ORDER — SEMAGLUTIDE 0.68 MG/ML
0.5 INJECTION, SOLUTION SUBCUTANEOUS
COMMUNITY

## 2023-04-26 RX ORDER — SEMAGLUTIDE 1.34 MG/ML
INJECTION, SOLUTION SUBCUTANEOUS
COMMUNITY
End: 2023-04-26

## 2023-04-26 RX ORDER — ATORVASTATIN CALCIUM 10 MG/1
10 TABLET, FILM COATED ORAL EVERY MORNING
COMMUNITY

## 2023-04-26 RX ORDER — BIOTIN 10 MG
TABLET ORAL DAILY
COMMUNITY

## 2023-04-26 RX ORDER — BUPROPION HYDROCHLORIDE 300 MG/1
TABLET ORAL EVERY MORNING
COMMUNITY

## 2023-04-26 RX ORDER — COVID-19 ANTIGEN TEST
KIT MISCELLANEOUS PRN
COMMUNITY

## 2023-04-26 NOTE — PREPROCEDURE INSTRUCTIONS
"Preadmit appointment: \" Preparing for your Procedure information\" sheet reviewed with patient with verbal and written instructions- emailed. Patient instructed to continue prescribed medications through the day before surgery, instructed to take the following medications the day of surgery per anesthesia protocol: Bupropion, Synthroid, atorvastatin  Patient instructed to call his doctor doing procedure/ surgery if any illness develops prior to surgery/procedure. METS >4.  Pt denies issues with anesthesia.  Pt advised to check with Dr. Gann regarding when to hold his blood thinner prior to surgery, Eliquis and pt reports he will.  Pt advised to check with MD that manages diabetic medications for further instructions regarding medication pre surgery. History of MRSA, pt denies any open wounds  "

## 2023-05-16 ENCOUNTER — APPOINTMENT (OUTPATIENT)
Dept: ADMISSIONS | Facility: MEDICAL CENTER | Age: 58
End: 2023-05-16
Attending: UROLOGY
Payer: COMMERCIAL

## 2023-05-16 DIAGNOSIS — Z01.812 PRE-OPERATIVE LABORATORY EXAMINATION: ICD-10-CM

## 2023-05-16 LAB
APPEARANCE UR: CLEAR
BILIRUB UR QL STRIP.AUTO: NEGATIVE
COLOR UR: YELLOW
GLUCOSE UR STRIP.AUTO-MCNC: 100 MG/DL
INR PPP: 1.11 (ref 0.87–1.13)
KETONES UR STRIP.AUTO-MCNC: NEGATIVE MG/DL
LEUKOCYTE ESTERASE UR QL STRIP.AUTO: NEGATIVE
MICRO URNS: ABNORMAL
NITRITE UR QL STRIP.AUTO: NEGATIVE
PH UR STRIP.AUTO: 5.5 [PH] (ref 5–8)
PROT UR QL STRIP: NEGATIVE MG/DL
PROTHROMBIN TIME: 14.2 SEC (ref 12–14.6)
RBC UR QL AUTO: NEGATIVE
SP GR UR STRIP.AUTO: >=1.03

## 2023-05-16 PROCEDURE — 81003 URINALYSIS AUTO W/O SCOPE: CPT

## 2023-05-16 PROCEDURE — 85610 PROTHROMBIN TIME: CPT

## 2023-05-16 PROCEDURE — 36415 COLL VENOUS BLD VENIPUNCTURE: CPT

## 2023-05-16 PROCEDURE — 87086 URINE CULTURE/COLONY COUNT: CPT

## 2023-05-18 LAB
BACTERIA UR CULT: NORMAL
SIGNIFICANT IND 70042: NORMAL
SITE SITE: NORMAL
SOURCE SOURCE: NORMAL

## 2024-04-10 ENCOUNTER — OFFICE VISIT (OUTPATIENT)
Dept: MEDICAL GROUP | Facility: PHYSICIAN GROUP | Age: 59
End: 2024-04-10
Payer: COMMERCIAL

## 2024-04-10 VITALS
OXYGEN SATURATION: 96 % | SYSTOLIC BLOOD PRESSURE: 136 MMHG | TEMPERATURE: 98.1 F | RESPIRATION RATE: 16 BRPM | HEART RATE: 84 BPM | HEIGHT: 76 IN | DIASTOLIC BLOOD PRESSURE: 58 MMHG | BODY MASS INDEX: 32.3 KG/M2 | WEIGHT: 265.21 LBS

## 2024-04-10 DIAGNOSIS — Z89.431 S/P TRANSMETATARSAL AMPUTATION OF FOOT, RIGHT (HCC): ICD-10-CM

## 2024-04-10 DIAGNOSIS — I48.20 CHRONIC ATRIAL FIBRILLATION (HCC): ICD-10-CM

## 2024-04-10 DIAGNOSIS — K74.60 CIRRHOSIS OF LIVER WITHOUT ASCITES, UNSPECIFIED HEPATIC CIRRHOSIS TYPE (HCC): ICD-10-CM

## 2024-04-10 PROBLEM — E11.628 DIABETIC FOOT INFECTION (HCC): Status: RESOLVED | Noted: 2021-09-25 | Resolved: 2024-04-10

## 2024-04-10 PROBLEM — L08.9 DIABETIC FOOT INFECTION (HCC): Status: RESOLVED | Noted: 2021-09-25 | Resolved: 2024-04-10

## 2024-04-10 PROBLEM — L97.512 NON-PRESSURE CHRONIC ULCER OF OTHER PART OF RIGHT FOOT WITH FAT LAYER EXPOSED (HCC): Status: RESOLVED | Noted: 2020-12-30 | Resolved: 2024-04-10

## 2024-04-10 PROBLEM — M86.9 OSTEOMYELITIS OF RIGHT FOOT (HCC): Status: RESOLVED | Noted: 2021-09-05 | Resolved: 2024-04-10

## 2024-04-10 PROCEDURE — 3075F SYST BP GE 130 - 139MM HG: CPT | Performed by: STUDENT IN AN ORGANIZED HEALTH CARE EDUCATION/TRAINING PROGRAM

## 2024-04-10 PROCEDURE — 3078F DIAST BP <80 MM HG: CPT | Performed by: STUDENT IN AN ORGANIZED HEALTH CARE EDUCATION/TRAINING PROGRAM

## 2024-04-10 PROCEDURE — 99204 OFFICE O/P NEW MOD 45 MIN: CPT | Performed by: STUDENT IN AN ORGANIZED HEALTH CARE EDUCATION/TRAINING PROGRAM

## 2024-04-10 RX ORDER — OXYCODONE HYDROCHLORIDE 5 MG/1
TABLET ORAL
COMMUNITY
End: 2024-04-10

## 2024-04-10 RX ORDER — INSULIN DEGLUDEC 200 U/ML
INJECTION, SOLUTION SUBCUTANEOUS
COMMUNITY
End: 2024-04-10

## 2024-04-10 RX ORDER — DIGOXIN 125 MCG
TABLET ORAL
COMMUNITY
End: 2024-04-10

## 2024-04-10 RX ORDER — BLOOD-GLUCOSE SENSOR
EACH MISCELLANEOUS
Qty: 2 EACH | Refills: 6 | Status: SHIPPED | OUTPATIENT
Start: 2024-04-10 | End: 2024-04-11

## 2024-04-10 RX ORDER — DULAGLUTIDE 0.75 MG/.5ML
INJECTION, SOLUTION SUBCUTANEOUS
COMMUNITY
End: 2024-04-10

## 2024-04-10 RX ORDER — NITROFURANTOIN 25; 75 MG/1; MG/1
CAPSULE ORAL
COMMUNITY
Start: 2023-03-27 | End: 2024-04-10

## 2024-04-10 RX ORDER — PRAZOSIN HYDROCHLORIDE 2 MG/1
CAPSULE ORAL
COMMUNITY

## 2024-04-10 RX ORDER — SILDENAFIL 100 MG/1
100 TABLET, FILM COATED ORAL
COMMUNITY
Start: 2023-08-02

## 2024-04-10 RX ORDER — INSULIN GLARGINE-YFGN 100 [IU]/ML
INJECTION, SOLUTION SUBCUTANEOUS
COMMUNITY
Start: 2024-02-19 | End: 2024-04-10

## 2024-04-10 RX ORDER — ESCITALOPRAM OXALATE 20 MG/1
TABLET ORAL
COMMUNITY
End: 2024-04-19

## 2024-04-10 RX ORDER — CARVEDILOL 3.12 MG/1
TABLET ORAL
COMMUNITY
End: 2024-04-10

## 2024-04-10 RX ORDER — SEMAGLUTIDE 1.34 MG/ML
1 INJECTION, SOLUTION SUBCUTANEOUS
Qty: 6 ML | Refills: 3 | Status: SHIPPED | OUTPATIENT
Start: 2024-04-10 | End: 2024-04-11

## 2024-04-10 RX ORDER — METRONIDAZOLE 500 MG/1
TABLET ORAL
COMMUNITY
End: 2024-04-10

## 2024-04-10 RX ORDER — INSULIN DEGLUDEC 200 U/ML
70 INJECTION, SOLUTION SUBCUTANEOUS EVERY MORNING
Qty: 30 ML | Refills: 3 | Status: SHIPPED | OUTPATIENT
Start: 2024-04-10 | End: 2024-04-11

## 2024-04-10 ASSESSMENT — ENCOUNTER SYMPTOMS
PALPITATIONS: 0
ABDOMINAL PAIN: 0
BLOOD IN STOOL: 0
VOMITING: 0
NAUSEA: 0
HEARTBURN: 0
SHORTNESS OF BREATH: 0

## 2024-04-10 ASSESSMENT — PATIENT HEALTH QUESTIONNAIRE - PHQ9: CLINICAL INTERPRETATION OF PHQ2 SCORE: 0

## 2024-04-10 ASSESSMENT — FIBROSIS 4 INDEX: FIB4 SCORE: 1.19

## 2024-04-11 RX ORDER — BLOOD-GLUCOSE SENSOR
EACH MISCELLANEOUS
Qty: 2 EACH | Refills: 6 | Status: SHIPPED | OUTPATIENT
Start: 2024-04-11 | End: 2024-04-19 | Stop reason: SDUPTHER

## 2024-04-11 RX ORDER — INSULIN DEGLUDEC 200 U/ML
70 INJECTION, SOLUTION SUBCUTANEOUS EVERY MORNING
Qty: 30 ML | Refills: 3 | Status: SHIPPED | OUTPATIENT
Start: 2024-04-11 | End: 2024-04-19 | Stop reason: SDUPTHER

## 2024-04-11 RX ORDER — SEMAGLUTIDE 1.34 MG/ML
1 INJECTION, SOLUTION SUBCUTANEOUS
Qty: 6 ML | Refills: 3 | Status: SHIPPED | OUTPATIENT
Start: 2024-04-11

## 2024-04-11 NOTE — PROGRESS NOTES
Verbal Consent given for ELYSE recording software    HISTORY OF PRESENT ILLNESS: Douglas is a pleasant 59 y.o. male, new  patient who presents today to discuss medical problems as listed below:    History of Present Illness  The patient is a 59-year-old male, a new patient, here to establish care.    #type 2 dm  The patient recently exhausted his supply of Ozempic, His previous primary care physician was Dr. Delaney with [facility], but due to financial constraints, he was unable to continue seeing Dr. Delaney. His current medication regimen includes Ozempic 0.5 mg weekly and metformin 1000 mg twice daily. Previously, he was on Tresiba 72 units in the morning, which effectively managed his blood glucose levels within three diabetic ranges, and Trulicity once a week. However, Semglee was not well-tolerated due to elevated blood glucose levels. He has been off Ozempic for 1 week and regularly monitors his blood glucose levels at home. He does not possess a continuous glucose monitor. He has an upcoming appointment with an endocrinologist on 05/14/2024. His diet does not include excessive sugar, and he prepares his own meals and consumes a diet rich in greens. His physical activity is limited due to foot swelling and agitation. His weight has decreased from 300 pounds to 265 pounds.    #liver cirrrhosis  In 1990, the patient contracted hepatitis C through the air gutter occulations in the . The infection persisted until 2016, after which he was cured. [facility], [facility] but expresses a desire to consult with a community doctor. He is currently on Lasix and potassium as needed, which he has been taking for an extended period. He denies any current swelling.    #afib  The patient has a history of atrial fibrillation and is currently on Eliquis 5 mg daily. He is not on carvedilol or digoxin. He is not currently seeing a cardiologist, having had 2 cardioversions in the past. He  denies any diagnosis of heart failure. His cardiologist has informed him that his heart is in good condition, except for atrial fibrillation.    #pad  The patient has peripheral arterial disease in his legs, which he attributes to neuropathy. He denies any cramping. His foot infection led to osteomyelitis, which led to amputation of all of his toes on his right foot.     Supplemental Information  He is taking Lipitor for cholesterol.   He has quit smoking.       Current Outpatient Medications Ordered in Epic   Medication Sig Dispense Refill    sildenafil citrate (VIAGRA) 100 MG tablet 100 mg.      Insulin Degludec (TRESIBA FLEXTOUCH) 200 UNIT/ML Solution Pen-injector Inject 70 Units under the skin every morning. 30 mL 3    Semaglutide, 1 MG/DOSE, (OZEMPIC, 1 MG/DOSE,) 4 MG/3ML Solution Pen-injector Inject 1 mg under the skin every 7 days. 6 mL 3    Continuous Blood Gluc Sensor (FREESTYLE GUILLERMINA 3 SENSOR) Misc For continuous glucose monitoring 2 Each 6    cholecalciferol (VITAMIN D3) 5000 UNIT Cap Take 1 Capsule by mouth every day. 180 Capsule 1    buPROPion (WELLBUTRIN XL) 300 MG XL tablet every morning.      Naproxen Sodium (ALEVE) 220 MG Cap Take  by mouth as needed.      asa/apap/caffeine (EXCEDRIN) 250-250-65 MG Tab Take 1 Tablet by mouth every 6 hours as needed for Headache.      Multiple Vitamins-Minerals (MULTIVITAMIN ADULT) Chew Tab Chew every day.      apixaban (ELIQUIS) 5mg Tab Take 5 mg by mouth every morning.      atorvastatin (LIPITOR) 10 MG Tab Take 10 mg by mouth every morning.      levothyroxine (SYNTHROID) 75 MCG Tab **SYNTHROID** TAKE ONE TABLET BY MOUTH DAILY EVERY MORNING ON AN EMPTY STOMACH 30 Tab 0    furosemide (LASIX) 40 MG Tab Take 1 Tab by mouth every day. (Patient taking differently: Take 40 mg by mouth as needed.) 100 Tab 3    potassium chloride SA (KDUR) 20 MEQ Tab CR Take 1 Tab by mouth every day. (Patient taking differently: Take 20 mEq by mouth as needed. Takes when taking Furosemide)  90 Tab 3    metformin (GLUCOPHAGE) 1000 MG tablet Take 1 Tab by mouth 2 times a day, with meals. 180 Tab 0    escitalopram (LEXAPRO) 20 MG tablet orally orally for 30 day(s) (Patient not taking: Reported on 4/10/2024)      prazosin (MINIPRESS) 2 MG Cap       NON SPECIFIED every day. OTC- Horny goat weed with redd      ammonium lactate (LAC-HYDRIN) 12 % Lotion GENERIC FOR LAC-HYDRIN. APPLY TO AFFECTED AREA(S) TWO TIMES A DAY RUBBING IN THOROUGHLY (Patient taking differently: every morning. GENERIC FOR LAC-HYDRIN. APPLY TO AFFECTED AREA(S) TWO TIMES A DAY RUBBING IN THOROUGHLY) 1 Bottle 2     No current Epic-ordered facility-administered medications on file.       Review of systems:  Review of Systems   Respiratory:  Negative for shortness of breath.    Cardiovascular:  Negative for chest pain and palpitations.   Gastrointestinal:  Negative for abdominal pain, blood in stool, heartburn, melena, nausea and vomiting.         Patient Active Problem List    Diagnosis Date Noted    S/P transmetatarsal amputation of foot, right (HCC) 04/10/2024    History of complete ray amputation of first toe of right foot (HCC) 10/15/2021    Contracture of gastrocnemius muscle due to traumatic injury 10/15/2021    Transaminitis 09/25/2021    Atherosclerosis of native artery of both lower extremities with intermittent claudication (HCC) 11/10/2020    Cirrhosis of liver (HCC) 11/10/2020    Erectile dysfunction 11/10/2020    Essential hypertension 11/10/2020    Hyperhidrosis 11/10/2020    Overweight with body mass index (BMI) of 29 to 29.9 in adult 11/10/2020    Obesity 06/29/2020    Chronic atrial fibrillation (HCC) 12/17/2019    Tobacco abuse 12/17/2019    BMI 39.0-39.9,adult 12/05/2019    Mixed hyperlipidemia 12/05/2019    Ketogenic diet monitoring encounter 12/05/2019    Vitamin D deficiency 11/19/2019    Need for vaccination 11/05/2019    Establishing care with new doctor, encounter for 11/05/2019    Controlled substance agreement  "terminated- no further controlled Rx from [facility]. 2018    Type 2 diabetes mellitus with diabetic nephropathy (HCC) 2017    Generalized anxiety disorder 2017    Insomnia 2016    Stasis dermatitis of both legs 06/10/2015    Edema 06/10/2015    Neuropathy of foot 06/10/2015    H/O chronic active hepatitis 2015    Chronic pain due to injury 2014    Lumbago 2014    Hypothyroidism 2014     Past Surgical History:   Procedure Laterality Date    OTHER  2023    Cardioversion    HERNIA REPAIR  2015    umbilical repair    ENDOSCOPY PROCEDURE  06/10/2014    Performed by Frantz Hidalgo M.D. at SURGERY [facility] ORS    OPEN REDUCTION Left 2004    left ankle    ABDOMINAL EXPLORATION       Social History     Tobacco Use    Smoking status: Former     Current packs/day: 0.00     Average packs/day: 0.5 packs/day for 40.0 years (20.0 ttl pk-yrs)     Types: Cigarettes     Start date: 3/1/1982     Quit date: 3/1/2022     Years since quittin.1    Smokeless tobacco: Never   Vaping Use    Vaping Use: Every day    Substances: Nicotine, THC, Refills tanks once every 1 or 2 weeks    Devices: Refillable tank   Substance Use Topics    Alcohol use: Yes     Comment: 1 or 2 drinks per month    Drug use: Yes     Types: Inhaled     Comment: \"a marijuana for back pain\" 3-4 times per week      Family History   Problem Relation Age of Onset    Alcohol abuse Father     Heart Disease Father          of MI at 73    Other Sister          of sepsis, complications from thoracentesis    Heart Disease Paternal Grandfather          of MI ~70s     Current Outpatient Medications   Medication Sig Dispense Refill    sildenafil citrate (VIAGRA) 100 MG tablet 100 mg.      Insulin Degludec (TRESIBA FLEXTOUCH) 200 UNIT/ML Solution Pen-injector Inject 70 Units under the skin every morning. 30 mL 3    Semaglutide, 1 MG/DOSE, (OZEMPIC, 1 MG/DOSE,) 4 MG/3ML Solution Pen-injector Inject 1 mg under the skin " every 7 days. 6 mL 3    Continuous Blood Gluc Sensor (FREESTYLE GUILLERMINA 3 SENSOR) [facility] For continuous glucose monitoring 2 Each 6    cholecalciferol (VITAMIN D3) 5000 UNIT Cap Take 1 Capsule by mouth every day. 180 Capsule 1    buPROPion (WELLBUTRIN XL) 300 MG XL tablet every morning.      Naproxen Sodium (ALEVE) 220 MG Cap Take  by mouth as needed.      asa/apap/caffeine (EXCEDRIN) 250-250-65 MG Tab Take 1 Tablet by mouth every 6 hours as needed for Headache.      Multiple Vitamins-Minerals (MULTIVITAMIN ADULT) Chew Tab Chew every day.      apixaban (ELIQUIS) 5mg Tab Take 5 mg by mouth every morning.      atorvastatin (LIPITOR) 10 MG Tab Take 10 mg by mouth every morning.      levothyroxine (SYNTHROID) 75 MCG Tab **SYNTHROID** TAKE ONE TABLET BY MOUTH DAILY EVERY MORNING ON AN EMPTY STOMACH 30 Tab 0    furosemide (LASIX) 40 MG Tab Take 1 Tab by mouth every day. (Patient taking differently: Take 40 mg by mouth as needed.) 100 Tab 3    potassium chloride SA (KDUR) 20 MEQ Tab CR Take 1 Tab by mouth every day. (Patient taking differently: Take 20 mEq by mouth as needed. Takes when taking Furosemide) 90 Tab 3    metformin (GLUCOPHAGE) 1000 MG tablet Take 1 Tab by mouth 2 times a day, with meals. 180 Tab 0    escitalopram (LEXAPRO) 20 MG tablet orally orally for 30 day(s) (Patient not taking: Reported on 4/10/2024)      prazosin (MINIPRESS) 2 MG Cap       NON SPECIFIED every day. OTC- Horny goat weed with redd      ammonium lactate (LAC-HYDRIN) 12 % Lotion GENERIC FOR LAC-HYDRIN. APPLY TO AFFECTED AREA(S) TWO TIMES A DAY RUBBING IN THOROUGHLY (Patient taking differently: every morning. GENERIC FOR LAC-HYDRIN. APPLY TO AFFECTED AREA(S) TWO TIMES A DAY RUBBING IN THOROUGHLY) 1 Bottle 2     No current facility-administered medications for this visit.       Allergies:  Allergies   Allergen Reactions    Vicodin [Hydrocodone-Acetaminophen] Itching     itch       Allergies, past medical history, past surgical history, family  "history, social history reviewed and updated.    Objective:    /58   Pulse 84   Temp 36.7 °C (98.1 °F) (Temporal)   Resp 16   Ht 1.93 m (6' 4\")   Wt 120 kg (265 lb 3.4 oz)   SpO2 96%   BMI 32.28 kg/m²    Body mass index is 32.28 kg/m².    Physical exam:  General: Normal appearance, no acute distress, not ill-appearing  HEENT: EOM intact, conjunctiva normal limits, negative right/left eye discharge.  Sclera anicteric  Cardiovascular: Normal rate and rhythm, no murmurs  Pulmonary: No respiratory distress, no wheezing, no rales, breath sounds normal.  Abdomen: Bowel sounds normal, flat, soft. No distention ascites   Musculoskeletal: No edema bilaterally  Skin: Warm, dry, no lesions  Neurological: No focal deficits, normal gait  Psychiatric: Mood within normal limits    Assessment/Plan:    Assessment & Plan  1. Type 2 diabetes.  The patient's A1c level is currently 10.6. The patient's Ozempic dosage will be escalated to 1 mg weekly. Additionally, Tresiba long-acting insulin will be refilled, with a dosage of 70 units to be taken in the morning. Cont metformin 1000mg bid. A continuous glucose monitor has been prescribed. Should the patient not receive the continuous glucose monitor, he is advised to monitor his blood glucose levels at home, once in the morning fasting and 2 hours after every meal.    F/u 1 week for blood sugar review  Has endocrinology appt next month     2. Liver cirrhosis.  The patient's liver enzymes are currently within the normal range. The patient will be referred to a gastroenterologist for a new consultation. He is advised to continue Lasix as needed. No significant edema on exam today. This is due chronic hep c infection     3. Atrial fibrillation.  A referral to a cardiologist has been made.  Not rate controlled  On eliquis for anticoagulation   S/p ablation x2    Follow-up  The patient is scheduled for a follow-up visit next week.       Problem List Items Addressed This Visit  "      Chronic atrial fibrillation (HCC)    Relevant Medications    prazosin (MINIPRESS) 2 MG Cap    sildenafil citrate (VIAGRA) 100 MG tablet    Other Relevant Orders    REFERRAL TO CARDIOLOGY    Cirrhosis of liver (HCC)    Relevant Orders    Referral to Gastroenterology    S/P transmetatarsal amputation of foot, right (HCC)       Return in about 1 week (around 4/17/2024), or if symptoms worsen or fail to improve, for diabetes.

## 2024-04-18 ENCOUNTER — APPOINTMENT (OUTPATIENT)
Dept: MEDICAL GROUP | Facility: PHYSICIAN GROUP | Age: 59
End: 2024-04-18

## 2024-04-19 ENCOUNTER — OFFICE VISIT (OUTPATIENT)
Dept: MEDICAL GROUP | Facility: PHYSICIAN GROUP | Age: 59
End: 2024-04-19
Payer: COMMERCIAL

## 2024-04-19 ENCOUNTER — HOSPITAL ENCOUNTER (OUTPATIENT)
Facility: MEDICAL CENTER | Age: 59
End: 2024-04-19
Attending: STUDENT IN AN ORGANIZED HEALTH CARE EDUCATION/TRAINING PROGRAM
Payer: COMMERCIAL

## 2024-04-19 VITALS
DIASTOLIC BLOOD PRESSURE: 70 MMHG | RESPIRATION RATE: 16 BRPM | TEMPERATURE: 95.9 F | OXYGEN SATURATION: 94 % | HEIGHT: 76 IN | BODY MASS INDEX: 32.22 KG/M2 | HEART RATE: 104 BPM | SYSTOLIC BLOOD PRESSURE: 116 MMHG | WEIGHT: 264.55 LBS

## 2024-04-19 DIAGNOSIS — N52.9 ERECTILE DYSFUNCTION, UNSPECIFIED ERECTILE DYSFUNCTION TYPE: ICD-10-CM

## 2024-04-19 DIAGNOSIS — I48.20 CHRONIC ATRIAL FIBRILLATION (HCC): ICD-10-CM

## 2024-04-19 DIAGNOSIS — E11.21 TYPE 2 DIABETES MELLITUS WITH DIABETIC NEPHROPATHY, WITH LONG-TERM CURRENT USE OF INSULIN (HCC): ICD-10-CM

## 2024-04-19 DIAGNOSIS — Z79.4 TYPE 2 DIABETES MELLITUS WITH DIABETIC NEPHROPATHY, WITH LONG-TERM CURRENT USE OF INSULIN (HCC): ICD-10-CM

## 2024-04-19 DIAGNOSIS — Z89.431 S/P TRANSMETATARSAL AMPUTATION OF FOOT, RIGHT (HCC): ICD-10-CM

## 2024-04-19 DIAGNOSIS — E11.628 DIABETIC FOOT INFECTION (HCC): ICD-10-CM

## 2024-04-19 DIAGNOSIS — L08.9 DIABETIC FOOT INFECTION (HCC): ICD-10-CM

## 2024-04-19 DIAGNOSIS — K74.60 CIRRHOSIS OF LIVER WITHOUT ASCITES, UNSPECIFIED HEPATIC CIRRHOSIS TYPE (HCC): ICD-10-CM

## 2024-04-19 DIAGNOSIS — E03.9 HYPOTHYROIDISM, UNSPECIFIED TYPE: ICD-10-CM

## 2024-04-19 DIAGNOSIS — E55.9 VITAMIN D DEFICIENCY: ICD-10-CM

## 2024-04-19 LAB
AMBIGUOUS DTTM AMBI4: NORMAL
CREAT UR-MCNC: 271.71 MG/DL
MICROALBUMIN UR-MCNC: 1.4 MG/DL
MICROALBUMIN/CREAT UR: 5 MG/G (ref 0–30)

## 2024-04-19 PROCEDURE — 82043 UR ALBUMIN QUANTITATIVE: CPT

## 2024-04-19 PROCEDURE — 99214 OFFICE O/P EST MOD 30 MIN: CPT | Performed by: STUDENT IN AN ORGANIZED HEALTH CARE EDUCATION/TRAINING PROGRAM

## 2024-04-19 PROCEDURE — 3074F SYST BP LT 130 MM HG: CPT | Performed by: STUDENT IN AN ORGANIZED HEALTH CARE EDUCATION/TRAINING PROGRAM

## 2024-04-19 PROCEDURE — 82570 ASSAY OF URINE CREATININE: CPT

## 2024-04-19 PROCEDURE — 3078F DIAST BP <80 MM HG: CPT | Performed by: STUDENT IN AN ORGANIZED HEALTH CARE EDUCATION/TRAINING PROGRAM

## 2024-04-19 RX ORDER — BUPROPION HYDROCHLORIDE 150 MG/1
TABLET ORAL
COMMUNITY

## 2024-04-19 RX ORDER — INSULIN DEGLUDEC 200 U/ML
70 INJECTION, SOLUTION SUBCUTANEOUS EVERY MORNING
Qty: 30 ML | Refills: 3 | Status: SHIPPED | OUTPATIENT
Start: 2024-04-19 | End: 2024-04-19 | Stop reason: SDUPTHER

## 2024-04-19 RX ORDER — BLOOD-GLUCOSE SENSOR
EACH MISCELLANEOUS
Qty: 2 EACH | Refills: 6 | Status: SHIPPED | OUTPATIENT
Start: 2024-04-19 | End: 2024-04-19 | Stop reason: SDUPTHER

## 2024-04-19 RX ORDER — POTASSIUM CHLORIDE 1.5 G/1.58G
POWDER, FOR SOLUTION ORAL
COMMUNITY

## 2024-04-19 RX ORDER — AMOXICILLIN AND CLAVULANATE POTASSIUM 875; 125 MG/1; MG/1
TABLET, FILM COATED ORAL
COMMUNITY
Start: 2024-04-17

## 2024-04-19 RX ORDER — METOPROLOL TARTRATE 50 MG/1
TABLET, FILM COATED ORAL
COMMUNITY

## 2024-04-19 RX ORDER — BLOOD-GLUCOSE METER
EACH MISCELLANEOUS
COMMUNITY

## 2024-04-19 RX ORDER — BLOOD-GLUCOSE SENSOR
EACH MISCELLANEOUS
Qty: 2 EACH | Refills: 6 | Status: SHIPPED | OUTPATIENT
Start: 2024-04-19

## 2024-04-19 RX ORDER — SULFAMETHOXAZOLE AND TRIMETHOPRIM 800; 160 MG/1; MG/1
TABLET ORAL
COMMUNITY
Start: 2023-03-24 | End: 2024-04-19

## 2024-04-19 RX ORDER — INSULIN DEGLUDEC 200 U/ML
70 INJECTION, SOLUTION SUBCUTANEOUS EVERY MORNING
Qty: 30 ML | Refills: 3 | Status: SHIPPED | OUTPATIENT
Start: 2024-04-19

## 2024-04-19 RX ORDER — LEVOTHYROXINE SODIUM 100 UG/1
CAPSULE ORAL
COMMUNITY
End: 2024-04-19

## 2024-04-19 ASSESSMENT — ENCOUNTER SYMPTOMS
PALPITATIONS: 0
COUGH: 0
WHEEZING: 0
SHORTNESS OF BREATH: 0
ORTHOPNEA: 0
HEADACHES: 0
FOCAL WEAKNESS: 0
DIZZINESS: 0
FEVER: 0
CHILLS: 0

## 2024-04-19 ASSESSMENT — FIBROSIS 4 INDEX: FIB4 SCORE: 1.19

## 2024-04-20 NOTE — PROGRESS NOTES
Verbal Consent given for ELYSE recording software    HISTORY OF PRESENT ILLNESS: Douglas is a pleasant 59 y.o. male, established patient who presents today to discuss medical problems as listed below:    History of Present Illness  The patient presents for evaluation of multiple medical concerns.    # Right foot status post transmetatarsal amputation  # Right foot stump ulcer/wound  [facility].  They discharged him with Augmentin, Bactrim.  He reports this is doing well like to get it examined today    # Type 2 diabetes  Patient was not able to get the continuous glucose monitor nor his Tresiba long-acting insulin after last visit.  He only recently got the Ozempic 1 mg weekly dose and is also on metformin 1000 mg twice daily.  He reports his morning sugars around 250+       Current Outpatient Medications Ordered in Epic   Medication Sig Dispense Refill    metoprolol tartrate (LOPRESSOR) 50 MG Tab       apixaban (ELIQUIS) 5mg Tab Eliquis 5 mg tablet      buPROPion (WELLBUTRIN XL) 150 MG XL tablet       amoxicillin-clavulanate (AUGMENTIN) 875-125 MG Tab TAKE 1 TABLET BY MOUTH EVERY 12 HOURS FOR INFECTION UNTIL GONE FOR CELLULITIS      potassium chloride (KLOR-CON) 20 MEQ Pack orally orally for 30 day(s)      Blood Glucose Monitoring Suppl (ONE TOUCH ULTRA 2) w/Device Kit OneTouch Ultra2 Meter      cholecalciferol (VITAMIN D3) 5000 UNIT Cap Take 1 Capsule by mouth every day. 180 Capsule 1    Insulin Degludec (TRESIBA FLEXTOUCH) 200 UNIT/ML Solution Pen-injector Inject 70 Units under the skin every morning. 30 mL 3    Continuous Blood Gluc Sensor (FREESTYLE GUILLERMINA 3 SENSOR) [facility] For continuous glucose monitoring 2 Each 6    Semaglutide, 1 MG/DOSE, (OZEMPIC, 1 MG/DOSE,) 4 MG/3ML Solution Pen-injector Inject 1 mg under the skin every 7 days. 6 mL 3    prazosin (MINIPRESS) 2 MG Cap       sildenafil citrate (VIAGRA) 100 MG tablet 100 mg.      Naproxen Sodium  (ALEVE) 220 MG Cap Take  by mouth as needed.      asa/apap/caffeine (EXCEDRIN) 250-250-65 MG Tab Take 1 Tablet by mouth every 6 hours as needed for Headache.      Multiple Vitamins-Minerals (MULTIVITAMIN ADULT) Chew Tab Chew every day.      atorvastatin (LIPITOR) 10 MG Tab Take 10 mg by mouth every morning.      NON SPECIFIED every day. OTC- Horny goat weed with redd      levothyroxine (SYNTHROID) 75 MCG Tab **SYNTHROID** TAKE ONE TABLET BY MOUTH DAILY EVERY MORNING ON AN EMPTY STOMACH 30 Tab 0    furosemide (LASIX) 40 MG Tab Take 1 Tab by mouth every day. (Patient taking differently: Take 40 mg by mouth as needed.) 100 Tab 3    potassium chloride SA (KDUR) 20 MEQ Tab CR Take 1 Tab by mouth every day. (Patient taking differently: Take 20 mEq by mouth as needed. Takes when taking Furosemide) 90 Tab 3    metformin (GLUCOPHAGE) 1000 MG tablet Take 1 Tab by mouth 2 times a day, with meals. 180 Tab 0     No current Epic-ordered facility-administered medications on file.       Review of systems:  Review of Systems   Constitutional:  Negative for chills and fever.   Respiratory:  Negative for cough, shortness of breath and wheezing.    Cardiovascular:  Negative for chest pain, palpitations, orthopnea and leg swelling.   Musculoskeletal:  Negative for joint pain.   Neurological:  Negative for dizziness, focal weakness and headaches.         Patient Active Problem List    Diagnosis Date Noted    S/P transmetatarsal amputation of foot, right (HCC) 04/10/2024    History of complete ray amputation of first toe of right foot (HCC) 10/15/2021    Contracture of gastrocnemius muscle due to traumatic injury 10/15/2021    Transaminitis 09/25/2021    Atherosclerosis of native artery of both lower extremities with intermittent claudication (HCC) 11/10/2020    Cirrhosis of liver (HCC) 11/10/2020    Erectile dysfunction 11/10/2020    Essential hypertension 11/10/2020    Hyperhidrosis 11/10/2020    Overweight with body mass index (BMI) of  "29 to 29.9 in adult 11/10/2020    Obesity 2020    Chronic atrial fibrillation (HCC) 2019    Tobacco abuse 2019    BMI 39.0-39.9,adult 2019    Mixed hyperlipidemia 2019    Ketogenic diet monitoring encounter 2019    Vitamin D deficiency 2019    Need for vaccination 2019    Establishing care with new doctor, encounter for 2019    Controlled substance agreement terminated- no further controlled Rx from [facility]. 2018    Type 2 diabetes mellitus with diabetic nephropathy (HCC) 2017    Generalized anxiety disorder 2017    Insomnia 2016    Stasis dermatitis of both legs 06/10/2015    Edema 06/10/2015    Neuropathy of foot 06/10/2015    H/O chronic active hepatitis 2015    Chronic pain due to injury 2014    Lumbago 2014    Hypothyroidism 2014     Past Surgical History:   Procedure Laterality Date    OTHER  2023    Cardioversion    HERNIA REPAIR      umbilical repair    ENDOSCOPY PROCEDURE  06/10/2014    Performed by Frantz Hidalgo M.D. at SURGERY [facility] ORS    OPEN REDUCTION Left 2004    left ankle    ABDOMINAL EXPLORATION       Social History     Tobacco Use    Smoking status: Former     Current packs/day: 0.00     Average packs/day: 0.5 packs/day for 40.0 years (20.0 ttl pk-yrs)     Types: Cigarettes     Start date: 3/1/1982     Quit date: 3/1/2022     Years since quittin.1    Smokeless tobacco: Never   Vaping Use    Vaping Use: Every day    Substances: Nicotine, THC, Refills tanks once every 1 or 2 weeks    Devices: Refillable tank   Substance Use Topics    Alcohol use: Yes     Comment: 1 or 2 drinks per month    Drug use: Yes     Types: Inhaled     Comment: \"a marijuana for back pain\" 3-4 times per week      Family History   Problem Relation Age of Onset    Alcohol abuse Father     Heart Disease Father          of MI at 73    Other Sister          of sepsis, complications from thoracentesis    Heart " Disease Paternal Grandfather          of MI ~70s     Current Outpatient Medications   Medication Sig Dispense Refill    metoprolol tartrate (LOPRESSOR) 50 MG Tab       apixaban (ELIQUIS) 5mg Tab Eliquis 5 mg tablet      buPROPion (WELLBUTRIN XL) 150 MG XL tablet       amoxicillin-clavulanate (AUGMENTIN) 875-125 MG Tab TAKE 1 TABLET BY MOUTH EVERY 12 HOURS FOR INFECTION UNTIL GONE FOR CELLULITIS      potassium chloride (KLOR-CON) 20 MEQ Pack orally orally for 30 day(s)      Blood Glucose Monitoring Suppl (ONE TOUCH ULTRA 2) w/Device Kit OneTouch Ultra2 Meter      cholecalciferol (VITAMIN D3) 5000 UNIT Cap Take 1 Capsule by mouth every day. 180 Capsule 1    Insulin Degludec (TRESIBA FLEXTOUCH) 200 UNIT/ML Solution Pen-injector Inject 70 Units under the skin every morning. 30 mL 3    Continuous Blood Gluc Sensor (FREESTYLE GUILLERMINA 3 SENSOR) Misc For continuous glucose monitoring 2 Each 6    Semaglutide, 1 MG/DOSE, (OZEMPIC, 1 MG/DOSE,) 4 MG/3ML Solution Pen-injector Inject 1 mg under the skin every 7 days. 6 mL 3    prazosin (MINIPRESS) 2 MG Cap       sildenafil citrate (VIAGRA) 100 MG tablet 100 mg.      Naproxen Sodium (ALEVE) 220 MG Cap Take  by mouth as needed.      asa/apap/caffeine (EXCEDRIN) 250-250-65 MG Tab Take 1 Tablet by mouth every 6 hours as needed for Headache.      Multiple Vitamins-Minerals (MULTIVITAMIN ADULT) Chew Tab Chew every day.      atorvastatin (LIPITOR) 10 MG Tab Take 10 mg by mouth every morning.      NON SPECIFIED every day. OTC- Horny goat weed with redd      levothyroxine (SYNTHROID) 75 MCG Tab **SYNTHROID** TAKE ONE TABLET BY MOUTH DAILY EVERY MORNING ON AN EMPTY STOMACH 30 Tab 0    furosemide (LASIX) 40 MG Tab Take 1 Tab by mouth every day. (Patient taking differently: Take 40 mg by mouth as needed.) 100 Tab 3    potassium chloride SA (KDUR) 20 MEQ Tab CR Take 1 Tab by mouth every day. (Patient taking differently: Take 20 mEq by mouth as needed. Takes when taking Furosemide) 90 Tab 3  "   metformin (GLUCOPHAGE) 1000 MG tablet Take 1 Tab by mouth 2 times a day, with meals. 180 Tab 0     No current facility-administered medications for this visit.       Allergies:  Allergies   Allergen Reactions    Vicodin [Hydrocodone-Acetaminophen] Itching     itch    Hydrocodone-Acetaminophen Itching and Hives       Allergies, past medical history, past surgical history, family history, social history reviewed and updated.    Objective:    /70   Pulse (!) 104   Temp (!) 35.5 °C (95.9 °F) (Temporal)   Resp 16   Ht 1.93 m (6' 4\")   Wt 120 kg (264 lb 8.8 oz)   SpO2 94%   BMI 32.20 kg/m²    Body mass index is 32.2 kg/m².    Physical exam:  General: Normal appearance, no acute distress, not ill-appearing  HEENT: EOM intact, conjunctiva normal limits, negative right/left eye discharge.  Sclera anicteric  Cardiovascular: Normal rate and rhythm, no murmurs  Pulmonary: No respiratory distress, no wheezing, no rales, breath sounds normal.  Musculoskeletal: No edema bilaterally.  Right foot transmetatarsal amputation.  Stump has 1 ulceration with a possible tunneling tract.  No significant erythema or swelling or discharge.  Skin: Warm, dry, no lesions  Neurological: No focal deficits, normal gait  Psychiatric: Mood within normal limits    Assessment/Plan:    Assessment & Plan  1. Foot infection.  The foot wound on his right foot stump does not look to be significantly infected.  [facility]el[facility].  He is on Augmentin and Bactrim which she will continue.  I will refer him to podiatry for monitoring.  Injector might be some tunneling involved but no discharge erythema or significant swelling.    2.  Type 2 diabetes not not well-controlled  Patient has not been able to  his insulin.  Will refill and call pharmacy to confirm.  Rx continuous glucose monitor.  Continue Ozempic, metformin 1000 mg twice daily.  Follow-up in 1 week.  Patient reports that he was usually " well-controlled on Tresiba and Ozempic and when the insulin was changed she started to fall behind.  Will see him back next week with review his blood sugars and if needed I may start a prandial insulin.  He does have follow-up with endocrinology next month    3. Liver cirrhosis.  The patient has expressed a desire to consult with a hepatologist that is not with [facility].       Problem List Items Addressed This Visit       Hypothyroidism    Relevant Orders    TSH    FREE THYROXINE    Type 2 diabetes mellitus with diabetic nephropathy (HCC)    Relevant Medications    Insulin Degludec (TRESIBA FLEXTOUCH) 200 UNIT/ML Solution Pen-injector    Continuous Blood Gluc Sensor (FREESTYLE GUILLERMINA 3 SENSOR) Misc    Other Relevant Orders    Microalbumin Creat Ratio Urine (Clinic Collect)    Vitamin D deficiency    Relevant Medications    cholecalciferol (VITAMIN D3) 5000 UNIT Cap    Chronic atrial fibrillation (HCC)    Relevant Medications    metoprolol tartrate (LOPRESSOR) 50 MG Tab    apixaban (ELIQUIS) 5mg Tab    Other Relevant Orders    REFERRAL TO CARDIOLOGY    Cirrhosis of liver (HCC)    Relevant Orders    Referral to Gastroenterology    Erectile dysfunction    Relevant Orders    TESTOSTERONE SERUM    S/P transmetatarsal amputation of foot, right (HCC)    Relevant Orders    Referral to Podiatry     Other Visit Diagnoses       Diabetic foot infection (HCC)        Relevant Medications    Insulin Degludec (TRESIBA FLEXTOUCH) 200 UNIT/ML Solution Pen-injector    Other Relevant Orders    Referral to Podiatry            Return in about 1 week (around 4/26/2024), or if symptoms worsen or fail to improve.

## 2024-04-26 ENCOUNTER — APPOINTMENT (OUTPATIENT)
Dept: MEDICAL GROUP | Facility: PHYSICIAN GROUP | Age: 59
End: 2024-04-26
Payer: COMMERCIAL

## 2024-05-03 ENCOUNTER — TELEMEDICINE (OUTPATIENT)
Dept: MEDICAL GROUP | Facility: PHYSICIAN GROUP | Age: 59
End: 2024-05-03
Payer: COMMERCIAL

## 2024-05-03 ENCOUNTER — APPOINTMENT (OUTPATIENT)
Dept: MEDICAL GROUP | Facility: PHYSICIAN GROUP | Age: 59
End: 2024-05-03
Payer: COMMERCIAL

## 2024-05-03 VITALS
WEIGHT: 260 LBS | HEART RATE: 95 BPM | BODY MASS INDEX: 31.66 KG/M2 | OXYGEN SATURATION: 98 % | HEIGHT: 76 IN | SYSTOLIC BLOOD PRESSURE: 141 MMHG | DIASTOLIC BLOOD PRESSURE: 78 MMHG

## 2024-05-03 DIAGNOSIS — R10.84 GENERALIZED ABDOMINAL PAIN: ICD-10-CM

## 2024-05-03 PROCEDURE — 99213 OFFICE O/P EST LOW 20 MIN: CPT | Mod: 95 | Performed by: STUDENT IN AN ORGANIZED HEALTH CARE EDUCATION/TRAINING PROGRAM

## 2024-05-03 ASSESSMENT — FIBROSIS 4 INDEX: FIB4 SCORE: 1.19

## 2024-05-03 NOTE — PROGRESS NOTES
Virtual Visit: Established Patient   This visit was conducted via Zoom using secure and encrypted videoconferencing technology.   The patient was in their home in the [facility].    The patient's identity was confirmed and verbal consent was obtained for this virtual visit.    Subjective:   CC:   Chief Complaint   Patient presents with    Follow-Up     Stomach pain, vomiting diarrhea      Douglas Landa Jr. is a 59 y.o. male presenting for evaluation and management of:    Verbal consent was acquired by the patient to use Technology [employer] ambient listening note generation during this visit Yes     History of Present Illness  The patient is a 59-year-old male who is here for an acute visit via virtual visit.    The patient experienced severe gastrointestinal discomfort last night, likened to a scab in his stomach. He attempted to alleviate the discomfort by inserting his finger into his throat and subsequently experienced vomiting, predominantly fluid and minimal quantities of food. Upon his return home, he experienced lightheadedness and disorientation, necessitating multiple pauses to reach his residence. He also reported explosive diarrhea, particularly in the early morning hours. Currently, he reports feeling more stable with no urgency or abdominal pain. He experienced lightheadedness upon standing, which he attributes to his equilibrium. He denies experiencing shortness of breath. His nocturnal oxygen saturation levels have been in the low 90s.    Denies any chest pain, headaches.    The patient has been managing his type 2 diabetes with Tresiba and metformin. His blood glucose levels have been well-managed, with a reading of 154 and a blood pressure reading of 141/78. He has not been able to procure a continuous glucose monitor.        ROS   Per HPI    Current medicines (including changes today)  Current Outpatient Medications   Medication Sig Dispense Refill    apixaban (ELIQUIS) 5mg Tab Eliquis 5 mg  tablet      buPROPion (WELLBUTRIN XL) 150 MG XL tablet       potassium chloride (KLOR-CON) 20 MEQ Pack orally orally for 30 day(s)      Blood Glucose Monitoring Suppl (ONE TOUCH ULTRA 2) w/Device Kit OneTouch Ultra2 Meter      cholecalciferol (VITAMIN D3) 5000 UNIT Cap Take 1 Capsule by mouth every day. 180 Capsule 1    Insulin Degludec (TRESIBA FLEXTOUCH) 200 UNIT/ML Solution Pen-injector Inject 70 Units under the skin every morning. 30 mL 3    Continuous Blood Gluc Sensor (FREESTYLE GUILLERMINA 3 SENSOR) [facility] For continuous glucose monitoring 2 Each 6    Semaglutide, 1 MG/DOSE, (OZEMPIC, 1 MG/DOSE,) 4 MG/3ML Solution Pen-injector Inject 1 mg under the skin every 7 days. 6 mL 3    prazosin (MINIPRESS) 2 MG Cap       sildenafil citrate (VIAGRA) 100 MG tablet 100 mg.      Naproxen Sodium (ALEVE) 220 MG Cap Take  by mouth as needed.      asa/apap/caffeine (EXCEDRIN) 250-250-65 MG Tab Take 1 Tablet by mouth every 6 hours as needed for Headache.      atorvastatin (LIPITOR) 10 MG Tab Take 10 mg by mouth every morning.      NON SPECIFIED every day. OTC- Horny goat weed with redd      levothyroxine (SYNTHROID) 75 MCG Tab **SYNTHROID** TAKE ONE TABLET BY MOUTH DAILY EVERY MORNING ON AN EMPTY STOMACH 30 Tab 0    furosemide (LASIX) 40 MG Tab Take 1 Tab by mouth every day. (Patient taking differently: Take 40 mg by mouth as needed.) 100 Tab 3    metformin (GLUCOPHAGE) 1000 MG tablet Take 1 Tab by mouth 2 times a day, with meals. 180 Tab 0     No current facility-administered medications for this visit.       Patient Active Problem List    Diagnosis Date Noted    S/P transmetatarsal amputation of foot, right (HCC) 04/10/2024    History of complete ray amputation of first toe of right foot (HCC) 10/15/2021    Contracture of gastrocnemius muscle due to traumatic injury 10/15/2021    Transaminitis 09/25/2021    Atherosclerosis of native artery of both lower extremities with intermittent claudication (HCC) 11/10/2020    Cirrhosis of  "liver (HCC) 11/10/2020    Erectile dysfunction 11/10/2020    Essential hypertension 11/10/2020    Hyperhidrosis 11/10/2020    Overweight with body mass index (BMI) of 29 to 29.9 in adult 11/10/2020    Obesity 06/29/2020    Chronic atrial fibrillation (HCC) 12/17/2019    Tobacco abuse 12/17/2019    BMI 39.0-39.9,adult 12/05/2019    Mixed hyperlipidemia 12/05/2019    Ketogenic diet monitoring encounter 12/05/2019    Vitamin D deficiency 11/19/2019    Need for vaccination 11/05/2019    Establishing care with new doctor, encounter for 11/05/2019    Controlled substance agreement terminated- no further controlled Rx from [facility]. 02/26/2018    Type 2 diabetes mellitus with diabetic nephropathy (HCC) 07/24/2017    Generalized anxiety disorder 03/28/2017    Insomnia 08/31/2016    Stasis dermatitis of both legs 06/10/2015    Edema 06/10/2015    Neuropathy of foot 06/10/2015    H/O chronic active hepatitis 02/13/2015    Chronic pain due to injury 07/09/2014    Lumbago 07/01/2014    Hypothyroidism 07/01/2014        Objective:   BP (!) 141/78   Pulse 95   Ht 1.93 m (6' 4\")   Wt 118 kg (260 lb)   SpO2 98%   BMI 31.65 kg/m²     Physical Exam:  Constitutional: Alert, no distress, well-groomed.  Skin: No rashes in visible areas.  Eye: Round. Conjunctiva clear, lids normal. No icterus.   ENMT: Lips pink without lesions, good dentition, moist mucous membranes. Phonation normal.  Neck: No masses, no thyromegaly. Moves freely without pain.  Respiratory: Unlabored respiratory effort, no cough or audible wheeze  Psych: Alert and oriented x3, normal affect and mood.     Assessment and Plan:   The following treatment plan was discussed:     Assessment & Plan  1. Gastroenteritis.  I suspect his abdominal pain vomiting and diarrhea is secondary to gastroenteritis.  He does have type 2 diabetes and is on insulin.  I had him check his blood sugars right now and it is 150 so I am not concerned about DKA.  Blood pressures are within normal " limits.    Advised to go to the urgent care if symptoms worsen over the weekend    The patient was counseled to maintain adequate hydration.    2. Type 2 diabetes.  Currently back on his Tresiba, Ozempic and metformin.  Follow-up 2 weeks for blood sugar log review          Follow-up: Return in about 2 weeks (around 5/17/2024), or if symptoms worsen or fail to improve, for diabetes.

## 2024-05-10 ENCOUNTER — OFFICE VISIT (OUTPATIENT)
Dept: MEDICAL GROUP | Facility: PHYSICIAN GROUP | Age: 59
End: 2024-05-10
Payer: COMMERCIAL

## 2024-05-10 VITALS
RESPIRATION RATE: 14 BRPM | BODY MASS INDEX: 32.75 KG/M2 | HEART RATE: 88 BPM | TEMPERATURE: 97.3 F | OXYGEN SATURATION: 97 % | DIASTOLIC BLOOD PRESSURE: 74 MMHG | WEIGHT: 268.96 LBS | HEIGHT: 76 IN | SYSTOLIC BLOOD PRESSURE: 136 MMHG

## 2024-05-10 DIAGNOSIS — I48.20 CHRONIC ATRIAL FIBRILLATION (HCC): ICD-10-CM

## 2024-05-10 DIAGNOSIS — E11.21 TYPE 2 DIABETES MELLITUS WITH DIABETIC NEPHROPATHY, WITH LONG-TERM CURRENT USE OF INSULIN (HCC): ICD-10-CM

## 2024-05-10 DIAGNOSIS — Z79.4 TYPE 2 DIABETES MELLITUS WITH DIABETIC NEPHROPATHY, WITH LONG-TERM CURRENT USE OF INSULIN (HCC): ICD-10-CM

## 2024-05-10 PROCEDURE — 99214 OFFICE O/P EST MOD 30 MIN: CPT | Performed by: STUDENT IN AN ORGANIZED HEALTH CARE EDUCATION/TRAINING PROGRAM

## 2024-05-10 PROCEDURE — 93000 ELECTROCARDIOGRAM COMPLETE: CPT | Performed by: STUDENT IN AN ORGANIZED HEALTH CARE EDUCATION/TRAINING PROGRAM

## 2024-05-10 PROCEDURE — 3078F DIAST BP <80 MM HG: CPT | Performed by: STUDENT IN AN ORGANIZED HEALTH CARE EDUCATION/TRAINING PROGRAM

## 2024-05-10 PROCEDURE — 3075F SYST BP GE 130 - 139MM HG: CPT | Performed by: STUDENT IN AN ORGANIZED HEALTH CARE EDUCATION/TRAINING PROGRAM

## 2024-05-10 RX ORDER — AMOXICILLIN AND CLAVULANATE POTASSIUM 875; 125 MG/1; MG/1
TABLET, FILM COATED ORAL
COMMUNITY
Start: 2024-05-06

## 2024-05-10 RX ORDER — SEMAGLUTIDE 2.68 MG/ML
2 INJECTION, SOLUTION SUBCUTANEOUS
Qty: 6 ML | Refills: 3 | Status: SHIPPED | OUTPATIENT
Start: 2024-05-10

## 2024-05-10 RX ORDER — DOXYCYCLINE HYCLATE 100 MG
100 TABLET ORAL
COMMUNITY
Start: 2024-05-06

## 2024-05-10 ASSESSMENT — FIBROSIS 4 INDEX: FIB4 SCORE: 1.19

## 2024-05-10 NOTE — PROGRESS NOTES
Verbal Consent given for [employer] recording software    HISTORY OF PRESENT ILLNESS: Douglas is a pleasant 59 y.o. male, established patient who presents today to discuss medical problems as listed below:    History of Present Illness  The patient is a 59-year-old male here to follow up on his type 2 diabetes.    The patient has not been consistently monitoring his blood glucose levels since his last video conference. His readings have not exceeded 189 and the lowest was 123. His current medication regimen includes Tresiba 70 units once daily, metformin 1000 mg twice daily, and Ozempic 1 mg once weekly, which he has been taking for approximately one year. His postprandial blood glucose levels, taken 2 to 3 hours post-meal, were around 120 and 140, with the highest recorded at 174. However, they returned to 130 approximately 3 hours post-prandial.       Supplemental Information  [facility]. He has a follow-up appointment with the podiatrist in 2 weeks.       Current Outpatient Medications Ordered in Epic   Medication Sig Dispense Refill    doxycycline (VIBRAMYCIN) 100 MG Tab 100 mg.      amoxicillin-clavulanate (AUGMENTIN) 875-125 MG Tab TAKE 1 TABLET BY MOUTH EVERY 12 HOURS UNTIL GONE FOR  OSTEOMEYLITIS      Semaglutide, 2 MG/DOSE, (OZEMPIC, 2 MG/DOSE,) 8 MG/3ML Solution Pen-injector Inject 2 mg under the skin every 7 days. 6 mL 3    apixaban (ELIQUIS) 5mg Tab Eliquis 5 mg tablet      buPROPion (WELLBUTRIN XL) 150 MG XL tablet       potassium chloride (KLOR-CON) 20 MEQ Pack orally orally for 30 day(s)      Blood Glucose Monitoring Suppl (ONE TOUCH ULTRA 2) w/Device Kit OneTouch Ultra2 Meter      cholecalciferol (VITAMIN D3) 5000 UNIT Cap Take 1 Capsule by mouth every day. 180 Capsule 1    Insulin Degludec (TRESIBA FLEXTOUCH) 200 UNIT/ML Solution Pen-injector Inject 70 Units under the skin every morning. 30 mL 3    prazosin (MINIPRESS) 2 MG Cap       sildenafil citrate  (VIAGRA) 100 MG tablet 100 mg.      Naproxen Sodium (ALEVE) 220 MG Cap Take  by mouth as needed.      asa/apap/caffeine (EXCEDRIN) 250-250-65 MG Tab Take 1 Tablet by mouth every 6 hours as needed for Headache.      atorvastatin (LIPITOR) 10 MG Tab Take 10 mg by mouth every morning.      NON SPECIFIED every day. OTC- Horny goat weed with redd      levothyroxine (SYNTHROID) 75 MCG Tab **SYNTHROID** TAKE ONE TABLET BY MOUTH DAILY EVERY MORNING ON AN EMPTY STOMACH 30 Tab 0    furosemide (LASIX) 40 MG Tab Take 1 Tab by mouth every day. (Patient taking differently: Take 40 mg by mouth as needed.) 100 Tab 3    metformin (GLUCOPHAGE) 1000 MG tablet Take 1 Tab by mouth 2 times a day, with meals. 180 Tab 0     No current Epic-ordered facility-administered medications on file.       Review of systems:  Per HPI    Patient Active Problem List    Diagnosis Date Noted    Generalized abdominal pain 05/03/2024    S/P transmetatarsal amputation of foot, right (HCC) 04/10/2024    History of complete ray amputation of first toe of right foot (HCC) 10/15/2021    Contracture of gastrocnemius muscle due to traumatic injury 10/15/2021    Transaminitis 09/25/2021    Atherosclerosis of native artery of both lower extremities with intermittent claudication (HCC) 11/10/2020    Cirrhosis of liver (HCC) 11/10/2020    Erectile dysfunction 11/10/2020    Essential hypertension 11/10/2020    Hyperhidrosis 11/10/2020    Overweight with body mass index (BMI) of 29 to 29.9 in adult 11/10/2020    Obesity 06/29/2020    Chronic atrial fibrillation (HCC) 12/17/2019    Tobacco abuse 12/17/2019    BMI 39.0-39.9,adult 12/05/2019    Mixed hyperlipidemia 12/05/2019    Ketogenic diet monitoring encounter 12/05/2019    Vitamin D deficiency 11/19/2019    Need for vaccination 11/05/2019    Establishing care with new doctor, encounter for 11/05/2019    Controlled substance agreement terminated- no further controlled Rx from [facility]. 02/26/2018    Type 2 diabetes  "mellitus with diabetic nephropathy (HCC) 2017    Generalized anxiety disorder 2017    Insomnia 2016    Stasis dermatitis of both legs 06/10/2015    Edema 06/10/2015    Neuropathy of foot 06/10/2015    H/O chronic active hepatitis 2015    Chronic pain due to injury 2014    Lumbago 2014    Hypothyroidism 2014     Past Surgical History:   Procedure Laterality Date    OTHER  2023    Cardioversion    HERNIA REPAIR  2015    umbilical repair    ENDOSCOPY PROCEDURE  06/10/2014    Performed by Frantz Hidalgo M.D. at SURGERY [facility] ORS    OPEN REDUCTION Left 2004    left ankle    ABDOMINAL EXPLORATION       Social History     Tobacco Use    Smoking status: Former     Current packs/day: 0.00     Average packs/day: 0.5 packs/day for 40.0 years (20.0 ttl pk-yrs)     Types: Cigarettes     Start date: 3/1/1982     Quit date: 3/1/2022     Years since quittin.1    Smokeless tobacco: Never   Vaping Use    Vaping Use: Every day    Substances: Nicotine, THC, Refills tanks once every 1 or 2 weeks    Devices: Refillable tank   Substance Use Topics    Alcohol use: Yes     Comment: 1 or 2 drinks per month    Drug use: Yes     Types: Inhaled     Comment: \"a marijuana for back pain\" 3-4 times per week      Family History   Problem Relation Age of Onset    Alcohol abuse Father     Heart Disease Father          of MI at 73    Other Sister          of sepsis, complications from thoracentesis    Heart Disease Paternal Grandfather          of MI ~70s     Current Outpatient Medications   Medication Sig Dispense Refill    doxycycline (VIBRAMYCIN) 100 MG Tab 100 mg.      amoxicillin-clavulanate (AUGMENTIN) 875-125 MG Tab TAKE 1 TABLET BY MOUTH EVERY 12 HOURS UNTIL GONE FOR  OSTEOMEYLITIS      Semaglutide, 2 MG/DOSE, (OZEMPIC, 2 MG/DOSE,) 8 MG/3ML Solution Pen-injector Inject 2 mg under the skin every 7 days. 6 mL 3    apixaban (ELIQUIS) 5mg Tab Eliquis 5 mg tablet      buPROPion " "(WELLBUTRIN XL) 150 MG XL tablet       potassium chloride (KLOR-CON) 20 MEQ Pack orally orally for 30 day(s)      Blood Glucose Monitoring Suppl (ONE TOUCH ULTRA 2) w/Device Kit OneTouch Ultra2 Meter      cholecalciferol (VITAMIN D3) 5000 UNIT Cap Take 1 Capsule by mouth every day. 180 Capsule 1    Insulin Degludec (TRESIBA FLEXTOUCH) 200 UNIT/ML Solution Pen-injector Inject 70 Units under the skin every morning. 30 mL 3    prazosin (MINIPRESS) 2 MG Cap       sildenafil citrate (VIAGRA) 100 MG tablet 100 mg.      Naproxen Sodium (ALEVE) 220 MG Cap Take  by mouth as needed.      asa/apap/caffeine (EXCEDRIN) 250-250-65 MG Tab Take 1 Tablet by mouth every 6 hours as needed for Headache.      atorvastatin (LIPITOR) 10 MG Tab Take 10 mg by mouth every morning.      NON SPECIFIED every day. OTC- Horny goat weed with redd      levothyroxine (SYNTHROID) 75 MCG Tab **SYNTHROID** TAKE ONE TABLET BY MOUTH DAILY EVERY MORNING ON AN EMPTY STOMACH 30 Tab 0    furosemide (LASIX) 40 MG Tab Take 1 Tab by mouth every day. (Patient taking differently: Take 40 mg by mouth as needed.) 100 Tab 3    metformin (GLUCOPHAGE) 1000 MG tablet Take 1 Tab by mouth 2 times a day, with meals. 180 Tab 0     No current facility-administered medications for this visit.       Allergies:  Allergies   Allergen Reactions    Vicodin [Hydrocodone-Acetaminophen] Itching     itch    Hydrocodone-Acetaminophen Itching and Hives       Allergies, past medical history, past surgical history, family history, social history reviewed and updated.    Objective:    /74   Pulse 88   Temp 36.3 °C (97.3 °F) (Temporal)   Resp 14   Ht 1.93 m (6' 4\")   Wt 122 kg (268 lb 15.4 oz)   SpO2 97%   BMI 32.74 kg/m²    Body mass index is 32.74 kg/m².    Physical exam:  General: Normal appearance, no acute distress, not ill-appearing  HEENT: EOM intact, conjunctiva normal limits, negative right/left eye discharge.  Sclera anicteric  Cardiovascular: Normal rate and rhythm, " no murmurs  Pulmonary: No respiratory distress, no wheezing, no rales, breath sounds normal.  Abdomen: Bowel sounds normal, flat, soft.  Musculoskeletal: No edema bilaterally  Skin: Warm, dry, no lesions  Neurological: No focal deficits, normal gait  Psychiatric: Mood within normal limits    Monofilament testing with a 10 gram force: sensation intact: decreased bilaterally  Visual Inspection: Feet with maceration, ulcers, fissures.  Pedal pulses: intact bilaterally    EKG interpretation: Rate within normal limits.  No discernible P waves.  Normal axis, no change, T wave versions, normal intervals.    Assessment/Plan:    Assessment & Plan  1. Type 2 diabetes.  The current treatment plan involves the continuation of Tresiba 70 units in the morning, metformin 1000 mg twice daily, and increasing Ozempic to 2 mg per week. The patient is also advised to adhere to a low-carbohydrate, low-sugar diet. An eye examination will be conducted today.    .monifilament exam today shows ulcerations, chronic wounds, monitored by podiatry     2. Atrial Fibrillation.  A referral to a cardiologist will be made. [facility], which have been ordered.    3. Liver Cirrhosis.  A referral to a gastroenterologist has been made. The patient is instructed to contact the referral office to arrange an appointment.       Problem List Items Addressed This Visit       Type 2 diabetes mellitus with diabetic nephropathy (HCC)    Relevant Medications    Semaglutide, 2 MG/DOSE, (OZEMPIC, 2 MG/DOSE,) 8 MG/3ML Solution Pen-injector    Other Relevant Orders    POCT Retinal Eye Exam    Diabetic Monofilament LE Exam (Completed)    Chronic atrial fibrillation (HCC)    Relevant Orders    EKG - Clinic Performed (Completed)    EC-ECHOCARDIOGRAM COMPLETE W/O CONT       Return in about 4 weeks (around 6/7/2024), or if symptoms worsen or fail to improve.

## 2024-06-12 ENCOUNTER — OFFICE VISIT (OUTPATIENT)
Dept: MEDICAL GROUP | Facility: PHYSICIAN GROUP | Age: 59
End: 2024-06-12
Payer: MEDICARE

## 2024-06-12 VITALS
OXYGEN SATURATION: 93 % | HEIGHT: 76 IN | BODY MASS INDEX: 32.43 KG/M2 | RESPIRATION RATE: 14 BRPM | DIASTOLIC BLOOD PRESSURE: 74 MMHG | TEMPERATURE: 97.3 F | SYSTOLIC BLOOD PRESSURE: 118 MMHG | HEART RATE: 92 BPM | WEIGHT: 266.32 LBS

## 2024-06-12 DIAGNOSIS — E11.21 TYPE 2 DIABETES MELLITUS WITH DIABETIC NEPHROPATHY, WITH LONG-TERM CURRENT USE OF INSULIN (HCC): ICD-10-CM

## 2024-06-12 DIAGNOSIS — E03.9 HYPOTHYROIDISM, UNSPECIFIED TYPE: ICD-10-CM

## 2024-06-12 DIAGNOSIS — Z89.431 S/P TRANSMETATARSAL AMPUTATION OF FOOT, RIGHT (HCC): ICD-10-CM

## 2024-06-12 DIAGNOSIS — I48.20 CHRONIC ATRIAL FIBRILLATION (HCC): ICD-10-CM

## 2024-06-12 DIAGNOSIS — Z12.2 SCREENING FOR LUNG CANCER: ICD-10-CM

## 2024-06-12 DIAGNOSIS — Z79.4 TYPE 2 DIABETES MELLITUS WITH DIABETIC NEPHROPATHY, WITH LONG-TERM CURRENT USE OF INSULIN (HCC): ICD-10-CM

## 2024-06-12 PROBLEM — Z72.0 TOBACCO ABUSE: Status: RESOLVED | Noted: 2019-12-17 | Resolved: 2024-06-12

## 2024-06-12 PROCEDURE — 3074F SYST BP LT 130 MM HG: CPT | Performed by: STUDENT IN AN ORGANIZED HEALTH CARE EDUCATION/TRAINING PROGRAM

## 2024-06-12 PROCEDURE — 99214 OFFICE O/P EST MOD 30 MIN: CPT | Performed by: STUDENT IN AN ORGANIZED HEALTH CARE EDUCATION/TRAINING PROGRAM

## 2024-06-12 PROCEDURE — 3078F DIAST BP <80 MM HG: CPT | Performed by: STUDENT IN AN ORGANIZED HEALTH CARE EDUCATION/TRAINING PROGRAM

## 2024-06-12 RX ORDER — CARVEDILOL 6.25 MG/1
6.25 TABLET ORAL
COMMUNITY
Start: 2024-05-30

## 2024-06-12 ASSESSMENT — FIBROSIS 4 INDEX: FIB4 SCORE: 1.11

## 2024-06-12 NOTE — ASSESSMENT & PLAN NOTE
Needs a referral to a wound care specialist.  [facility] but would not like to be seen by them anymore.  He is also seeing [facility]ry with [facility].  Referral for wound care placed, sugars well under control.

## 2024-06-12 NOTE — ASSESSMENT & PLAN NOTE
Currently on Eliquis 5 mg daily  On Coreg 6.25 mg twice daily, Lasix as needed  Has referral to cardiology placed but would like [facility] not be seen in the [facility]

## 2024-06-12 NOTE — PROGRESS NOTES
HISTORY OF PRESENT ILLNESS: Douglas is a pleasant 59 y.o. male, established patient who presents today to discuss medical problems as listed below:    Problem   S/P Transmetatarsal Amputation of Foot, Right (Hcc)   Chronic Atrial Fibrillation (Hcc)   Type 2 Diabetes Mellitus With Diabetic Nephropathy (Hcc)    Last A1c 6.7 which is improving, is currently following up with endocrinology.  He is taking metformin 1000 twice daily, Ozempic 2 mg weekly, Tresiba 70 units every morning     Hypothyroidism   Tobacco Abuse (Resolved)        Current Outpatient Medications Ordered in Epic   Medication Sig Dispense Refill    rivaroxaban (XARELTO) 20 MG Tab tablet Take 20 mg by mouth.      carvedilol (COREG) 6.25 MG Tab Take 6.25 mg by mouth.      doxycycline (VIBRAMYCIN) 100 MG Tab 100 mg.      Semaglutide, 2 MG/DOSE, (OZEMPIC, 2 MG/DOSE,) 8 MG/3ML Solution Pen-injector Inject 2 mg under the skin every 7 days. 6 mL 3    buPROPion (WELLBUTRIN XL) 150 MG XL tablet       potassium chloride (KLOR-CON) 20 MEQ Pack orally orally for 30 day(s)      Blood Glucose Monitoring Suppl (ONE TOUCH ULTRA 2) w/Device Kit OneTouch Ultra2 Meter      cholecalciferol (VITAMIN D3) 5000 UNIT Cap Take 1 Capsule by mouth every day. 180 Capsule 1    Insulin Degludec (TRESIBA FLEXTOUCH) 200 UNIT/ML Solution Pen-injector Inject 70 Units under the skin every morning. 30 mL 3    prazosin (MINIPRESS) 2 MG Cap       sildenafil citrate (VIAGRA) 100 MG tablet 100 mg.      Naproxen Sodium (ALEVE) 220 MG Cap Take  by mouth as needed.      asa/apap/caffeine (EXCEDRIN) 250-250-65 MG Tab Take 1 Tablet by mouth every 6 hours as needed for Headache.      atorvastatin (LIPITOR) 10 MG Tab Take 10 mg by mouth every morning.      NON SPECIFIED every day. OTC- Horny goat weed with redd      levothyroxine (SYNTHROID) 75 MCG Tab **SYNTHROID** TAKE ONE TABLET BY MOUTH DAILY EVERY MORNING ON AN EMPTY STOMACH 30 Tab 0    furosemide (LASIX) 40 MG Tab Take 1 Tab by mouth every  day. (Patient taking differently: Take 40 mg by mouth as needed.) 100 Tab 3    metformin (GLUCOPHAGE) 1000 MG tablet Take 1 Tab by mouth 2 times a day, with meals. 180 Tab 0     No current Epic-ordered facility-administered medications on file.       Review of systems:  Per HPI    Patient Active Problem List    Diagnosis Date Noted    Generalized abdominal pain 05/03/2024    S/P transmetatarsal amputation of foot, right (HCC) 04/10/2024    History of complete ray amputation of first toe of right foot (HCC) 10/15/2021    Contracture of gastrocnemius muscle due to traumatic injury 10/15/2021    Transaminitis 09/25/2021    Atherosclerosis of native artery of both lower extremities with intermittent claudication (HCC) 11/10/2020    Cirrhosis of liver (HCC) 11/10/2020    Erectile dysfunction 11/10/2020    Essential hypertension 11/10/2020    Hyperhidrosis 11/10/2020    Overweight with body mass index (BMI) of 29 to 29.9 in adult 11/10/2020    Obesity 06/29/2020    Chronic atrial fibrillation (HCC) 12/17/2019    BMI 39.0-39.9,adult 12/05/2019    Mixed hyperlipidemia 12/05/2019    Ketogenic diet monitoring encounter 12/05/2019    Vitamin D deficiency 11/19/2019    Need for vaccination 11/05/2019    Establishing care with new doctor, encounter for 11/05/2019    Controlled substance agreement terminated- no further controlled Rx from [facility]. 02/26/2018    Type 2 diabetes mellitus with diabetic nephropathy (HCC) 07/24/2017    Generalized anxiety disorder 03/28/2017    Insomnia 08/31/2016    Stasis dermatitis of both legs 06/10/2015    Edema 06/10/2015    Neuropathy of foot 06/10/2015    H/O chronic active hepatitis 02/13/2015    Chronic pain due to injury 07/09/2014    Lumbago 07/01/2014    Hypothyroidism 07/01/2014     Past Surgical History:   Procedure Laterality Date    OTHER  01/2023    Cardioversion    HERNIA REPAIR  2015    umbilical repair    ENDOSCOPY PROCEDURE  06/10/2014    Performed by Frantz Hidalgo M.D. at SURGERY  "[facility]    OPEN REDUCTION Left 2004    left ankle    ABDOMINAL EXPLORATION       Social History     Tobacco Use    Smoking status: Former     Current packs/day: 0.00     Average packs/day: 0.8 packs/day for 40.0 years (30.0 ttl pk-yrs)     Types: Cigarettes     Start date: 3/1/1982     Quit date: 3/1/2022     Years since quittin.2    Smokeless tobacco: Never   Vaping Use    Vaping status: Every Day    Substances: Nicotine, THC, Refills tanks once every 1 or 2 weeks    Devices: Refillable tank   Substance Use Topics    Alcohol use: Yes     Comment: 1 or 2 drinks per month    Drug use: Yes     Types: Inhaled     Comment: \"a marijuana for back pain\" 3-4 times per week      Family History   Problem Relation Age of Onset    Alcohol abuse Father     Heart Disease Father          of MI at 73    Other Sister          of sepsis, complications from thoracentesis    Heart Disease Paternal Grandfather          of MI ~70s     Current Outpatient Medications   Medication Sig Dispense Refill    rivaroxaban (XARELTO) 20 MG Tab tablet Take 20 mg by mouth.      carvedilol (COREG) 6.25 MG Tab Take 6.25 mg by mouth.      doxycycline (VIBRAMYCIN) 100 MG Tab 100 mg.      Semaglutide, 2 MG/DOSE, (OZEMPIC, 2 MG/DOSE,) 8 MG/3ML Solution Pen-injector Inject 2 mg under the skin every 7 days. 6 mL 3    buPROPion (WELLBUTRIN XL) 150 MG XL tablet       potassium chloride (KLOR-CON) 20 MEQ Pack orally orally for 30 day(s)      Blood Glucose Monitoring Suppl (ONE TOUCH ULTRA 2) w/Device Kit OneTouch Ultra2 Meter      cholecalciferol (VITAMIN D3) 5000 UNIT Cap Take 1 Capsule by mouth every day. 180 Capsule 1    Insulin Degludec (TRESIBA FLEXTOUCH) 200 UNIT/ML Solution Pen-injector Inject 70 Units under the skin every morning. 30 mL 3    prazosin (MINIPRESS) 2 MG Cap       sildenafil citrate (VIAGRA) 100 MG tablet 100 mg.      Naproxen Sodium (ALEVE) 220 MG Cap Take  by mouth as needed.      asa/apap/caffeine (EXCEDRIN) " "250-250-65 MG Tab Take 1 Tablet by mouth every 6 hours as needed for Headache.      atorvastatin (LIPITOR) 10 MG Tab Take 10 mg by mouth every morning.      NON SPECIFIED every day. OTC- Horny goat weed with redd      levothyroxine (SYNTHROID) 75 MCG Tab **SYNTHROID** TAKE ONE TABLET BY MOUTH DAILY EVERY MORNING ON AN EMPTY STOMACH 30 Tab 0    furosemide (LASIX) 40 MG Tab Take 1 Tab by mouth every day. (Patient taking differently: Take 40 mg by mouth as needed.) 100 Tab 3    metformin (GLUCOPHAGE) 1000 MG tablet Take 1 Tab by mouth 2 times a day, with meals. 180 Tab 0     No current facility-administered medications for this visit.       Allergies:  Allergies   Allergen Reactions    Vicodin [Hydrocodone-Acetaminophen] Itching     itch    Hydrocodone-Acetaminophen Itching and Hives       Allergies, past medical history, past surgical history, family history, social history reviewed and updated.    Objective:    /74   Pulse 92   Temp 36.3 °C (97.3 °F) (Temporal)   Resp 14   Ht 1.93 m (6' 4\")   Wt 121 kg (266 lb 5.1 oz)   SpO2 93%   BMI 32.42 kg/m²    Body mass index is 32.42 kg/m².    Physical exam:  General: Normal appearance, no acute distress, not ill-appearing  HEENT: EOM intact, conjunctiva normal limits, negative right/left eye discharge.  Sclera anicteric  Cardiovascular: Normal rate and rhythm, no murmurs  Pulmonary: No respiratory distress, no wheezing, no rales, breath sounds normal.  Musculoskeletal: No edema bilaterally  Skin: Warm, dry, no lesions  Neurological: No focal deficits, normal gait  Psychiatric: Mood within normal limits    Assessment/Plan:    Problem List Items Addressed This Visit       Hypothyroidism     Managed endocrinology currently 75 mcg daily.         Type 2 diabetes mellitus with diabetic nephropathy (HCC)     Management per endocrinology         Chronic atrial fibrillation (HCC)     Currently on Eliquis 5 mg daily  On Coreg 6.25 mg twice daily, Lasix as needed  Has " referral to cardiology placed but [facility]         Relevant Medications    rivaroxaban (XARELTO) 20 MG Tab tablet    carvedilol (COREG) 6.25 MG Tab    Other Relevant Orders    REFERRAL TO CARDIOLOGY    S/P transmetatarsal amputation of foot, right (HCC)     Needs a referral to a wound care specialist.  [facility] but [facility] not [facility] to be [facility] by them anymore.  [facility].  Referral for wound care placed, sugars well under control.           Relevant Orders    Referral to Wound Clinic     Other Visit Diagnoses       Screening for lung cancer        Relevant Orders    REFERRAL TO LUNG CANCER SCREENING PROGRAM             Return if symptoms worsen or fail to improve.

## 2024-06-17 RX ORDER — ALBUTEROL SULFATE 2.5 MG/3ML
2.5 SOLUTION RESPIRATORY (INHALATION) EVERY 4 HOURS PRN
COMMUNITY
End: 2024-06-17 | Stop reason: SDUPTHER

## 2024-06-17 RX ORDER — ALBUTEROL SULFATE 2.5 MG/3ML
2.5 SOLUTION RESPIRATORY (INHALATION) EVERY 4 HOURS PRN
Qty: 1 EACH | Refills: 0 | Status: SHIPPED | OUTPATIENT
Start: 2024-06-17

## 2024-06-18 NOTE — TELEPHONE ENCOUNTER
Received request via: Patient    Was the patient seen in the last year in this department? Yes    Does the patient have an active prescription (recently filled or refills available) for medication(s) requested? No    Pharmacy Name: \[facility]\"" pharmacy     Does the patient have [facility] and need 100 day supply (blood pressure, diabetes and cholesterol meds only)? Patient does not have SCP

## 2024-06-20 ENCOUNTER — TELEPHONE (OUTPATIENT)
Dept: HEALTH INFORMATION MANAGEMENT | Facility: OTHER | Age: 59
End: 2024-06-20
Payer: MEDICARE

## 2024-06-20 NOTE — TELEPHONE ENCOUNTER
1. Age 50-77 yrs of age? 59    2. Total Years Smoking cigarettes? 45    3. 20 pack year hx of smoking, or greater (average packs per day)?   45yrs @1pk/day= 45pk yrs     4. Current smoker or if quit, has pt quit within last 15 yrs? Quit, 2 years ago     5. Completed Lung Cancer screen CT with [facility] previously? No     6. Anything noted on previous CT involving lungs? (Nodules, Mass, Etc.) No     7. Any signs or symptoms of lung cancer? (New / change to Cough, Wheezing, S.O.B., coughing up blood, unexplained weight loss within last year)?  No    8. Previous history of lung cancer? No

## 2024-06-20 NOTE — TELEPHONE ENCOUNTER
Spoke to pt and he stated needs RX sent to [facility] [address]. [facility] [place]       Thank you

## 2024-06-21 DIAGNOSIS — Z79.4 TYPE 2 DIABETES MELLITUS WITH DIABETIC NEPHROPATHY, WITH LONG-TERM CURRENT USE OF INSULIN (HCC): ICD-10-CM

## 2024-06-21 DIAGNOSIS — E11.21 TYPE 2 DIABETES MELLITUS WITH DIABETIC NEPHROPATHY, WITH LONG-TERM CURRENT USE OF INSULIN (HCC): ICD-10-CM

## 2024-06-25 RX ORDER — SEMAGLUTIDE 2.68 MG/ML
2 INJECTION, SOLUTION SUBCUTANEOUS
Qty: 6 ML | Refills: 3 | Status: SHIPPED | OUTPATIENT
Start: 2024-06-25

## 2024-06-25 RX ORDER — INSULIN DEGLUDEC 200 U/ML
70 INJECTION, SOLUTION SUBCUTANEOUS EVERY MORNING
Qty: 30 ML | Refills: 3 | Status: SHIPPED | OUTPATIENT
Start: 2024-06-25

## 2024-08-16 ENCOUNTER — TELEPHONE (OUTPATIENT)
Dept: MEDICAL GROUP | Facility: PHYSICIAN GROUP | Age: 59
End: 2024-08-16
Payer: COMMERCIAL

## 2024-08-16 DIAGNOSIS — Z79.4 TYPE 2 DIABETES MELLITUS WITH DIABETIC NEPHROPATHY, WITH LONG-TERM CURRENT USE OF INSULIN (HCC): ICD-10-CM

## 2024-08-16 DIAGNOSIS — E11.21 TYPE 2 DIABETES MELLITUS WITH DIABETIC NEPHROPATHY, WITH LONG-TERM CURRENT USE OF INSULIN (HCC): ICD-10-CM

## 2024-08-16 RX ORDER — INSULIN DEGLUDEC 100 U/ML
70 INJECTION, SOLUTION SUBCUTANEOUS EVERY MORNING
Qty: 9 ML | Refills: 3 | Status: SHIPPED | OUTPATIENT
Start: 2024-08-16

## 2024-08-16 NOTE — TELEPHONE ENCOUNTER
Prior Auth denied for Insulin Degludec (TRESIBA FLEXTOUCH).   Approved for Basaglar Kwikpen U100 insulin subcutaneous insulin pen 100 units/ml (3 ml), Tresiba Flextouch U-100 subcutaneous insulin pen 100 units/ml (3 ml), Tresiba Flextouch U-200 subcutaneous insulin pen 200 units/ml (3 ml) and Tresiba U-100 insulin subcutaneous solution 100 unit/ml

## 2024-08-19 NOTE — PROGRESS NOTES
"Subjective     Douglas Landa Jr. is a 59 y.o. male who presents with Lung Cancer Screening Program Prescreen  Hx of nicotine dependence          HPI  Patient seen today for initial lung cancer screening visit. Patient referred by Dr. Melissa Suh.   His PCP is Dr. Teofilo Arvizu with [facility].    The patient meets eligibility criteria including age, smoking history (20+ pack years), if former smoker, quit in the last 15 years, and absence of signs or symptoms of lung cancer.    - Age - 59  - Smoking history - Patient has smoked for 42 years at an average of 0.75 ppd = 31 pack year smoking history.  - Current smoking status - former smoker, quit smoking 3/1/22  - No symptoms of lung cancer and no previous history of lung cancer     He was hospitalized last week after being without his normal home medications.  He had a CTA chest on 8/17/24 that showed, \"bilateral upper lobe sub 5mm pulmonary nodules.  These are unchanged from 2020.\"    Allergies   Allergen Reactions    Vicodin [Hydrocodone-Acetaminophen] Itching     itch    Hydrocodone-Acetaminophen Itching and Hives       Current Outpatient Medications on File Prior to Visit   Medication Sig Dispense Refill    risperiDONE (RISPERDAL) 2 MG Tab Take 2 mg by mouth every day.      metoprolol tartrate (LOPRESSOR) 50 MG Tab Take 50 mg by mouth 2 times a day.      apixaban (ELIQUIS) 5mg Tab Take 5 mg by mouth 2 times a day.      hydrOXYzine HCl (ATARAX) 25 MG Tab Take 25 mg by mouth every 8 hours as needed for Anxiety.      metFORMIN (GLUCOPHAGE) 500 MG Tab Take 500 mg by mouth 2 times a day with meals.      Insulin Degludec FlexTouch 100 UNIT/ML Solution Pen-injector Inject 70 Units under the skin every morning. Tresiba flextouch u-100 which is approved by insurance 9 mL 3    Semaglutide, 2 MG/DOSE, (OZEMPIC, 2 MG/DOSE,) 8 MG/3ML Solution Pen-injector Inject 2 mg under the skin every 7 days. 6 mL 3    albuterol (PROVENTIL) 2.5mg/3ml Nebu Soln solution for " "nebulization Take 3 mL by nebulization every four hours as needed for Shortness of Breath. 1 Each 0    buPROPion (WELLBUTRIN XL) 150 MG XL tablet Take 300 mg by mouth every morning.      potassium chloride (KLOR-CON) 20 MEQ Pack orally orally for 30 day(s)      Blood Glucose Monitoring Suppl (ONE TOUCH ULTRA 2) w/Device Kit OneTouch Ultra2 Meter      cholecalciferol (VITAMIN D3) 5000 UNIT Cap Take 1 Capsule by mouth every day. 180 Capsule 1    prazosin (MINIPRESS) 2 MG Cap       sildenafil citrate (VIAGRA) 100 MG tablet 100 mg.      Naproxen Sodium (ALEVE) 220 MG Cap Take  by mouth as needed.      asa/apap/caffeine (EXCEDRIN) 250-250-65 MG Tab Take 1 Tablet by mouth every 6 hours as needed for Headache.      atorvastatin (LIPITOR) 10 MG Tab Take 10 mg by mouth every morning.      NON SPECIFIED every day. OTC- Horny goat weed with redd      levothyroxine (SYNTHROID) 75 MCG Tab **SYNTHROID** TAKE ONE TABLET BY MOUTH DAILY EVERY MORNING ON AN EMPTY STOMACH 30 Tab 0    furosemide (LASIX) 40 MG Tab Take 1 Tab by mouth every day. (Patient taking differently: Take 40 mg by mouth as needed.) 100 Tab 3     No current facility-administered medications on file prior to visit.       Review of Systems   Constitutional:  Negative for chills and fever.        He has had purposeful weight loss recently with ozempic   Respiratory:  Negative for cough, hemoptysis, sputum production, shortness of breath and wheezing.    Cardiovascular:  Negative for chest pain and palpitations.   He previously had chest discomfort and SOB when hospitalized last week but this has resolved.           Objective     /84 (BP Location: Right arm, Patient Position: Sitting, BP Cuff Size: Adult)   Pulse 70   Resp 16   Ht 1.93 m (6' 4\")   Wt 121 kg (266 lb)   SpO2 96%   BMI 32.38 kg/m²      Physical Exam  Constitutional:       Appearance: Normal appearance.   Cardiovascular:      Rate and Rhythm: Normal rate. Rhythm irregular.   Pulmonary:      " Effort: Pulmonary effort is normal.      Breath sounds: Normal breath sounds.   Musculoskeletal:      Comments: Mild bilateral edema   Neurological:      Mental Status: He is alert.                   Assessment & Plan        Assessment & Plan  Personal history of tobacco use, presenting hazards to health    Orders:    CT-LUNG CANCER-SCREENING; Future       We conducted a shared decision-making process using a decision aid. We reviewed benefits and harms of screening, including false positives and potential need for additional diagnostic testing, the possibility of over diagnosis, and total radiation exposure.    We discussed the importance of adhering to annual LDCT screening. We also discussed the impact of comorbities on the patient's the ability or willingness to undergo diagnostic procedure(s) and treatment.    Counseling on the importance of maintaining cigarette smoking abstinence if former smoker; or the importance of smoking cessation if current smoker and, if appropriate, furnishing of information about tobacco cessation interventions.    Based on our discussion, we have decided to begin lung cancer screening on or after 8/18/2025.    Douglas is interested in participating in research regarding lung cancer screening. His contact information was given to the research team.     No follow-up needed unless imaging is abnormal

## 2024-08-26 ENCOUNTER — OFFICE VISIT (OUTPATIENT)
Dept: SLEEP MEDICINE | Facility: MEDICAL CENTER | Age: 59
End: 2024-08-26
Attending: FAMILY MEDICINE
Payer: COMMERCIAL

## 2024-08-26 VITALS
BODY MASS INDEX: 32.39 KG/M2 | DIASTOLIC BLOOD PRESSURE: 84 MMHG | WEIGHT: 266 LBS | RESPIRATION RATE: 16 BRPM | HEART RATE: 70 BPM | HEIGHT: 76 IN | OXYGEN SATURATION: 96 % | SYSTOLIC BLOOD PRESSURE: 110 MMHG

## 2024-08-26 DIAGNOSIS — Z87.891 PERSONAL HISTORY OF TOBACCO USE, PRESENTING HAZARDS TO HEALTH: ICD-10-CM

## 2024-08-26 PROCEDURE — 3079F DIAST BP 80-89 MM HG: CPT | Performed by: FAMILY MEDICINE

## 2024-08-26 PROCEDURE — 3074F SYST BP LT 130 MM HG: CPT | Performed by: FAMILY MEDICINE

## 2024-08-26 PROCEDURE — G0296 VISIT TO DETERM LDCT ELIG: HCPCS | Performed by: FAMILY MEDICINE

## 2024-08-26 RX ORDER — METOPROLOL TARTRATE 50 MG
50 TABLET ORAL 2 TIMES DAILY
COMMUNITY
Start: 2024-08-17

## 2024-08-26 RX ORDER — RISPERIDONE 2 MG/1
2 TABLET ORAL DAILY
COMMUNITY
Start: 2024-08-02

## 2024-08-26 RX ORDER — HYDROXYZINE HYDROCHLORIDE 25 MG/1
25 TABLET, FILM COATED ORAL EVERY 8 HOURS PRN
COMMUNITY

## 2024-08-26 ASSESSMENT — ENCOUNTER SYMPTOMS
CHILLS: 0
WHEEZING: 0
COUGH: 0
SHORTNESS OF BREATH: 0
SPUTUM PRODUCTION: 0
HEMOPTYSIS: 0
FEVER: 0
PALPITATIONS: 0

## 2024-08-26 ASSESSMENT — FIBROSIS 4 INDEX: FIB4 SCORE: 0.68

## 2024-09-17 ENCOUNTER — TELEPHONE (OUTPATIENT)
Dept: HEALTH INFORMATION MANAGEMENT | Facility: OTHER | Age: 59
End: 2024-09-17
Payer: COMMERCIAL

## 2024-10-03 ENCOUNTER — TELEPHONE (OUTPATIENT)
Dept: HEALTH INFORMATION MANAGEMENT | Facility: OTHER | Age: 59
End: 2024-10-03
Payer: COMMERCIAL

## 2025-04-14 DIAGNOSIS — E11.21 TYPE 2 DIABETES MELLITUS WITH DIABETIC NEPHROPATHY, WITH LONG-TERM CURRENT USE OF INSULIN (HCC): ICD-10-CM

## 2025-04-14 DIAGNOSIS — Z79.4 TYPE 2 DIABETES MELLITUS WITH DIABETIC NEPHROPATHY, WITH LONG-TERM CURRENT USE OF INSULIN (HCC): ICD-10-CM

## 2025-04-14 NOTE — TELEPHONE ENCOUNTER
Received request via: Patient    Was the patient seen in the last year in this department? Yes    Does the patient have an active prescription (recently filled or refills available) for medication(s) requested? No    [facility]    [facility] and need 100-day supply? (This applies to ALL medications) Patient does not have SCP

## 2025-04-15 RX ORDER — SEMAGLUTIDE 2.68 MG/ML
2 INJECTION, SOLUTION SUBCUTANEOUS
Qty: 6 ML | Refills: 3 | Status: SHIPPED | OUTPATIENT
Start: 2025-04-15

## 2025-07-28 ENCOUNTER — TELEPHONE (OUTPATIENT)
Dept: SLEEP MEDICINE | Facility: MEDICAL CENTER | Age: 60
End: 2025-07-28
Payer: COMMERCIAL

## 2025-07-28 NOTE — LETTER
Count includes the [facility]  [address]  [place] [address]  P [phone]                                    F [phone]           July 28, 2025       Douglas Landa Jr.  [address] [address]        Dear  Landa,     Our records indicate that you are due for your routine lung screening exam on 8/18/25. Please call us to verify that you continue to meet criteria and schedule your exam at [phone].    As you know, early detection of cancer is very important.  Although annual lung cancer screening with CT scans is recommended by the American [employer] Society, they also emphasize that screening is not a substitute for quitting smoking.  The most effective way to lower lung cancer risk is to stay away from tobacco.     If you are completing your care at another facility, please call us so we can update our records.    Thank you for participating in the Lung Cancer Screening program. If you have any questions about this letter or our program, please call our Outbound Team at [phone].    Sincerely,    Dr. Breanna Carmen  [facility] Lung Cancer Screening Program

## 2025-07-28 NOTE — TELEPHONE ENCOUNTER
SDM 8/26/24.  Last qualifying CT scan 8/17/24 for lung cancer screening.  Letter created and sent.    -Dr. Breanna Carmen